# Patient Record
Sex: FEMALE | Race: WHITE | NOT HISPANIC OR LATINO | Employment: FULL TIME | URBAN - METROPOLITAN AREA
[De-identification: names, ages, dates, MRNs, and addresses within clinical notes are randomized per-mention and may not be internally consistent; named-entity substitution may affect disease eponyms.]

---

## 2023-03-29 PROBLEM — E66.813 OBESITY, CLASS III, BMI 40-49.9 (MORBID OBESITY): Status: ACTIVE | Noted: 2023-03-29

## 2023-03-29 PROBLEM — E66.01 OBESITY, CLASS III, BMI 40-49.9 (MORBID OBESITY) (HCC): Status: ACTIVE | Noted: 2023-03-29

## 2023-04-17 PROBLEM — E66.9 OBESITY (BMI 30-39.9): Status: RESOLVED | Noted: 2020-09-23 | Resolved: 2023-04-17

## 2023-05-08 ENCOUNTER — TELEPHONE (OUTPATIENT)
Dept: BARIATRICS | Facility: CLINIC | Age: 33
End: 2023-05-08

## 2023-05-08 DIAGNOSIS — E66.01 OBESITY, CLASS III, BMI 40-49.9 (MORBID OBESITY) (HCC): Primary | ICD-10-CM

## 2023-05-08 NOTE — TELEPHONE ENCOUNTER
Patient is calling asking for the next dose of Wegovy  She states that she took the previous without incident

## 2023-05-21 ENCOUNTER — OFFICE VISIT (OUTPATIENT)
Dept: URGENT CARE | Age: 33
End: 2023-05-21

## 2023-05-21 VITALS
OXYGEN SATURATION: 99 % | HEIGHT: 66 IN | HEART RATE: 90 BPM | BODY MASS INDEX: 38.57 KG/M2 | TEMPERATURE: 97.7 F | RESPIRATION RATE: 18 BRPM | WEIGHT: 240 LBS

## 2023-05-21 DIAGNOSIS — J02.9 SORE THROAT: Primary | ICD-10-CM

## 2023-05-21 DIAGNOSIS — J02.9 SORE THROAT: ICD-10-CM

## 2023-05-21 DIAGNOSIS — H69.83 EUSTACHIAN TUBE DYSFUNCTION, BILATERAL: ICD-10-CM

## 2023-05-21 LAB — S PYO AG THROAT QL: NEGATIVE

## 2023-05-21 RX ORDER — LIDOCAINE HYDROCHLORIDE 20 MG/ML
10 SOLUTION OROPHARYNGEAL 4 TIMES DAILY PRN
Qty: 200 ML | Refills: 0 | Status: SHIPPED | OUTPATIENT
Start: 2023-05-21 | End: 2023-05-21

## 2023-05-21 RX ORDER — LIDOCAINE HYDROCHLORIDE 20 MG/ML
10 SOLUTION OROPHARYNGEAL 4 TIMES DAILY PRN
Qty: 200 ML | Refills: 0 | Status: SHIPPED | OUTPATIENT
Start: 2023-05-21

## 2023-05-21 RX ORDER — AZELASTINE 1 MG/ML
1 SPRAY, METERED NASAL 2 TIMES DAILY
Qty: 1 ML | Refills: 0 | Status: SHIPPED | OUTPATIENT
Start: 2023-05-21

## 2023-05-21 NOTE — PROGRESS NOTES
[address] [facility]        NAME: Destinee Werner is a 29 y o  female  : 1990    MRN: [id number]  DATE: May 21, 2023  TIME: 5:35 PM    Assessment and Plan   Sore throat [J02 9]  1  Sore throat  POCT rapid strepA    Throat culture    Lidocaine Viscous HCl (XYLOCAINE) 2 % mucosal solution      2  Eustachian tube dysfunction, bilateral  azelastine (ASTELIN) 0 1 % nasal spray            Patient Instructions   Lidocaine gargles up to once every 4-6 hours for sore throat  Astelin nasal spray as directed  Take an over the counter decongestant such as guaifenesin  Other methods to soothe sore throat: Tea with honey, salt water gargles 4 times day (1/2 teaspoon of salt in 8ox water), and/or Chloraseptic throat spray    Follow up with PCP in 5-7days  Proceed to ER if symptoms worsen significantly  Chief Complaint     Chief Complaint   Patient presents with   • Sore Throat     Sore throat x 2 days, ear congestion x 2 days         History of Present Illness       This is a 29year old female with 2 day history of sore throat and bilateral ear pressure  Also admits to body aches, post nasal drip, and forehead pressure  Has brief dizziness if she stands up to quickly  Has tried tylenol, no other medications  Denies n/v/d/c, headache, cough, or runny nose  Sore Throat   This is a new problem  The current episode started in the past 7 days  Associated symptoms include ear pain and headaches  Pertinent negatives include no abdominal pain, congestion, coughing, ear discharge, shortness of breath or vomiting  Review of Systems   Review of Systems   Constitutional: Negative for chills and fever  HENT: Positive for ear pain, postnasal drip, sinus pressure and sore throat  Negative for congestion, ear discharge and rhinorrhea  Eyes: Negative for pain and visual disturbance  Respiratory: Negative for cough and shortness of breath  Cardiovascular: Negative for chest pain and palpitations     Gastrointestinal: Negative for abdominal pain and vomiting  Genitourinary: Negative for dysuria and hematuria  Musculoskeletal: Negative for arthralgias and back pain  Skin: Negative for color change and rash  Neurological: Positive for headaches  Negative for seizures and syncope  All other systems reviewed and are negative  Current Medications       Current Outpatient Medications:   •  azelastine (ASTELIN) 0 1 % nasal spray, 1 spray into each nostril 2 (two) times a day Use in each nostril as directed, Disp: 1 mL, Rfl: 0  •  Lidocaine Viscous HCl (XYLOCAINE) 2 % mucosal solution, Swish and spit 10 mL 4 (four) times a day as needed for mouth pain or discomfort, Disp: 200 mL, Rfl: 0  •  omeprazole (PriLOSEC) 20 mg delayed release capsule, TAKE 1 CAPSULE BY MOUTH EVERY DAY, Disp: 90 capsule, Rfl: 1  •  Semaglutide-Weight Management (WEGOVY) 0 5 MG/0 5ML, Inject 0 5 mL (0 5 mg total) under the skin once a week for 28 days, Disp: 2 mL, Rfl: 0  •  acetaminophen (TYLENOL) 500 mg tablet, Take 500 mg by mouth every 6 (six) hours as needed, Disp: , Rfl:   •  budesonide (Pulmicort) 1 MG/2ML nebulizer solution, Take 2 mL (1 mg total) by nebulization daily Rinse mouth after use   (Patient not taking: Reported on 2023), Disp: 60 mL, Rfl: 2    Current Allergies     Allergies as of 2023 - Reviewed 2023   Allergen Reaction Noted   • Ibuprofen Other (See Comments) 2020   • Penicillins Fever 2019            The following portions of the patient's history were reviewed and updated as appropriate: allergies, current medications, past family history, past medical history, past social history, past surgical history and problem list      Past Medical History:   Diagnosis Date   • Allergic    • Anxiety    • Depression    • GERD (gastroesophageal reflux disease)    • Obesity        Past Surgical History:   Procedure Laterality Date   • BARIATRIC SURGERY N/A 2018   •  SECTION         Family History "  Problem Relation Age of Onset   • No Known Problems Mother    • No Known Problems Father    • Depression Sister    • No Known Problems Brother    • No Known Problems Son    • No Known Problems Daughter    • Alzheimer's disease Maternal Grandmother    • Cancer Maternal Grandfather    • No Known Problems Paternal Grandmother    • Stroke Paternal Grandfather    • No Known Problems Brother          Medications have been verified  Objective   Pulse 90   Temp 97 7 °F (36 5 °C)   Resp 18   Ht 5' 6\" (1 676 m)   Wt 109 kg (240 lb) Comment: stated  SpO2 99%   BMI 38 74 kg/m²   No LMP recorded  Physical Exam     Physical Exam  Constitutional:       General: She is not in acute distress  Appearance: Normal appearance  She is normal weight  She is not ill-appearing  HENT:      Head: Normocephalic and atraumatic  Right Ear: Ear canal and external ear normal       Left Ear: Ear canal and external ear normal       Ears:      Comments: Bilateral TMs cloudy  No erythema  Nose: Nose normal       Mouth/Throat:      Mouth: Mucous membranes are moist       Pharynx: Oropharynx is clear  Posterior oropharyngeal erythema present  No oropharyngeal exudate  Comments: Mild pharyngeal erythema  No exudates  Eyes:      Extraocular Movements: Extraocular movements intact  Conjunctiva/sclera: Conjunctivae normal       Pupils: Pupils are equal, round, and reactive to light  Cardiovascular:      Rate and Rhythm: Normal rate and regular rhythm  Heart sounds: Normal heart sounds  No murmur heard  Pulmonary:      Effort: Pulmonary effort is normal  No respiratory distress  Breath sounds: Normal breath sounds  No stridor  No wheezing, rhonchi or rales  Abdominal:      General: Abdomen is flat  Bowel sounds are normal       Palpations: Abdomen is soft  Tenderness: There is no abdominal tenderness  Musculoskeletal:         General: Normal range of motion        Cervical back: Normal range " of motion and neck supple  No rigidity or tenderness  Right lower leg: No edema  Left lower leg: No edema  Lymphadenopathy:      Cervical: No cervical adenopathy  Skin:     General: Skin is warm and dry  Capillary Refill: Capillary refill takes less than 2 seconds  Neurological:      General: No focal deficit present  Mental Status: She is alert and oriented to person, place, and time  Mental status is at baseline  Psychiatric:         Mood and Affect: Mood normal          Behavior: Behavior normal          Thought Content:  Thought content normal          Judgment: Judgment normal        Mary Barcenas PA-C

## 2023-05-21 NOTE — PATIENT INSTRUCTIONS
Lidocaine gargles up to once every 4-6 hours for sore throat  Astelin nasal spray as directed  Take an over the counter decongestant such as guaifenesin  Other methods to soothe sore throat: Tea with honey, salt water gargles 4 times day (1/2 teaspoon of salt in 8ox water), and/or Chloraseptic throat spray    Follow up with PCP in 5-7days  Proceed to ER if symptoms worsen significantly

## 2023-05-24 LAB — BACTERIA THROAT CULT: NORMAL

## 2023-06-09 ENCOUNTER — TELEPHONE (OUTPATIENT)
Dept: BARIATRICS | Facility: CLINIC | Age: 33
End: 2023-06-09

## 2023-06-09 DIAGNOSIS — E66.01 OBESITY, CLASS III, BMI 40-49.9 (MORBID OBESITY) (HCC): Primary | ICD-10-CM

## 2023-06-09 DIAGNOSIS — E66.01 OBESITY, CLASS III, BMI 40-49.9 (MORBID OBESITY) (HCC): ICD-10-CM

## 2023-06-09 NOTE — TELEPHONE ENCOUNTER
Patient is calling asking for the next dose of Wegovy  She states that she has tolerated the previous dose without incident

## 2023-06-12 DIAGNOSIS — E66.01 OBESITY, CLASS III, BMI 40-49.9 (MORBID OBESITY) (HCC): ICD-10-CM

## 2023-06-12 RX ORDER — SEMAGLUTIDE 1 MG/.5ML
INJECTION, SOLUTION SUBCUTANEOUS
Refills: 0 | OUTPATIENT
Start: 2023-06-12

## 2023-06-12 NOTE — TELEPHONE ENCOUNTER
Please notify the patient that I have sent this to the [facility] in [place] as they have this medication in stock

## 2023-06-19 ENCOUNTER — TELEPHONE (OUTPATIENT)
Dept: BARIATRICS | Facility: CLINIC | Age: 33
End: 2023-06-19

## 2023-06-19 NOTE — TELEPHONE ENCOUNTER
Pt is currently out of the area and unsure if it's temporary or permanent so she will call us back when she knows her schedule to r/s 6/19/23 mw appt

## 2023-07-14 ENCOUNTER — TELEPHONE (OUTPATIENT)
Dept: BARIATRICS | Facility: CLINIC | Age: 33
End: 2023-07-14

## 2023-07-14 DIAGNOSIS — E66.01 OBESITY, CLASS III, BMI 40-49.9 (MORBID OBESITY) (HCC): Primary | ICD-10-CM

## 2023-07-14 NOTE — TELEPHONE ENCOUNTER
Can you please send a script for the 0.5mg of Wegovy to [address] [place] [address] [facility]. I gave them a verbal but they need a script also.

## 2023-08-22 DIAGNOSIS — E66.01 OBESITY, CLASS III, BMI 40-49.9 (MORBID OBESITY) (HCC): Primary | ICD-10-CM

## 2023-09-11 DIAGNOSIS — E66.01 OBESITY, CLASS III, BMI 40-49.9 (MORBID OBESITY) (HCC): ICD-10-CM

## 2023-09-12 DIAGNOSIS — E66.01 OBESITY, CLASS III, BMI 40-49.9 (MORBID OBESITY) (HCC): Primary | ICD-10-CM

## 2023-09-12 RX ORDER — SEMAGLUTIDE 0.5 MG/.5ML
INJECTION, SOLUTION SUBCUTANEOUS
Qty: 2 ML | Refills: 0 | OUTPATIENT
Start: 2023-09-12

## 2023-09-12 NOTE — TELEPHONE ENCOUNTER
Patient was informed. She states that she is now taking the 0.5mg of Wegovy and she has tolerated it without incident. She is asking that you send the 1mg of Wegovy to the [facility].

## 2023-09-22 ENCOUNTER — TELEPHONE (OUTPATIENT)
Dept: BARIATRICS | Facility: CLINIC | Age: 33
End: 2023-09-22

## 2023-09-22 NOTE — TELEPHONE ENCOUNTER
LMOM to Inform pt her [facility] is approved but the insurance wont cover the wegovy at [facility], she needs to find a pharmacy that approves the pharmacy

## 2024-02-21 PROBLEM — Z00.00 ROUTINE HEALTH MAINTENANCE: Status: RESOLVED | Noted: 2020-09-24 | Resolved: 2024-02-21

## 2024-06-28 ENCOUNTER — TELEPHONE (OUTPATIENT)
Age: 34
End: 2024-06-28

## 2024-06-28 NOTE — TELEPHONE ENCOUNTER
Patient is a past patient of Dr Alan who has not been seen in the office since 4/2023, she moved to [place] but is moving back to the area in the fall.  She is requesting a new Wegovy .25 rx be sent to the [facility] at [address] in [place], [place].  She is aware that Dr ZIMMERMAN may want to see her before he writes the rx and she requested a virtual visit.  Can someone from the office please give her a call at [phone]?  Thanks.

## 2024-07-01 NOTE — TELEPHONE ENCOUNTER
Spoke to pt made her aware appt needs to be in person right now she is unable to make it into office due to being too far from [facility], she is going to reach out to her pcp to see if they can help her with medication until she can get into our office in December.

## 2024-07-25 DIAGNOSIS — Z00.6 ENCOUNTER FOR EXAMINATION FOR NORMAL COMPARISON OR CONTROL IN CLINICAL RESEARCH PROGRAM: ICD-10-CM

## 2024-08-09 ENCOUNTER — OFFICE VISIT (OUTPATIENT)
Dept: FAMILY MEDICINE CLINIC | Facility: CLINIC | Age: 34
End: 2024-08-09
Payer: COMMERCIAL

## 2024-08-09 VITALS
WEIGHT: 255.8 LBS | TEMPERATURE: 98.7 F | HEIGHT: 66 IN | SYSTOLIC BLOOD PRESSURE: 140 MMHG | BODY MASS INDEX: 41.11 KG/M2 | DIASTOLIC BLOOD PRESSURE: 60 MMHG | HEART RATE: 91 BPM | OXYGEN SATURATION: 96 %

## 2024-08-09 DIAGNOSIS — E66.01 OBESITY, CLASS III, BMI 40-49.9 (MORBID OBESITY) (HCC): Primary | ICD-10-CM

## 2024-08-09 DIAGNOSIS — R73.01 IFG (IMPAIRED FASTING GLUCOSE): ICD-10-CM

## 2024-08-09 DIAGNOSIS — Z00.00 ROUTINE HEALTH MAINTENANCE: ICD-10-CM

## 2024-08-09 DIAGNOSIS — D50.9 IRON DEFICIENCY ANEMIA, UNSPECIFIED IRON DEFICIENCY ANEMIA TYPE: ICD-10-CM

## 2024-08-09 DIAGNOSIS — E55.9 VITAMIN D DEFICIENCY: ICD-10-CM

## 2024-08-09 PROCEDURE — 99214 OFFICE O/P EST MOD 30 MIN: CPT | Performed by: FAMILY MEDICINE

## 2024-08-09 PROCEDURE — 99395 PREV VISIT EST AGE 18-39: CPT | Performed by: FAMILY MEDICINE

## 2024-08-09 RX ORDER — SEMAGLUTIDE 0.25 MG/.5ML
INJECTION, SOLUTION SUBCUTANEOUS
Qty: 2 ML | Refills: 1 | Status: SHIPPED | OUTPATIENT
Start: 2024-08-09 | End: 2024-08-22

## 2024-08-12 NOTE — PROGRESS NOTES
Assessment/Plan:    33 y/o woman with:  morbid obesity, iron-deficiency anemia, IFG and vit. D def. Along with annual well visit. Will begin trial of Wegovy will check labs. Discussed supportive care and return parameters.     Regarding Annual well visit. Discussed various safety and health maintenance issues including healthy diet like the Mediterranean diet, exercise, ample sleep, stress reduction, and healthy weight as tolerated. Discussed supportive care and return parameters.     No problem-specific Assessment & Plan notes found for this encounter.       Diagnoses and all orders for this visit:    Obesity, Class III, BMI 40-49.9 (morbid obesity) ([facility])  -     Semaglutide-Weight Management (Wegovy) 0.25 MG/0.5ML; Inject 0.25 mg under the skin weekly  -     CBC and differential; Future  -     Comprehensive metabolic panel; Future  -     TSH, 3rd generation with Free T4 reflex; Future  -     Lipid Panel with Direct LDL reflex; Future  -     Hemoglobin A1C; Future  -     Albumin / creatinine urine ratio; Future  -     Vitamin D 25 hydroxy; Future  -     Iron Panel (Includes Ferritin, Iron Sat%, Iron, and TIBC); Future    Routine health maintenance  -     CBC and differential; Future  -     Comprehensive metabolic panel; Future  -     TSH, 3rd generation with Free T4 reflex; Future  -     Lipid Panel with Direct LDL reflex; Future  -     Hemoglobin A1C; Future  -     Albumin / creatinine urine ratio; Future  -     Vitamin D 25 hydroxy; Future  -     Iron Panel (Includes Ferritin, Iron Sat%, Iron, and TIBC); Future    Iron deficiency anemia, unspecified iron deficiency anemia type  -     CBC and differential; Future  -     Comprehensive metabolic panel; Future  -     TSH, 3rd generation with Free T4 reflex; Future  -     Lipid Panel with Direct LDL reflex; Future  -     Hemoglobin A1C; Future  -     Albumin / creatinine urine ratio; Future  -     Vitamin D 25 hydroxy; Future  -     Iron Panel (Includes Ferritin, Iron  Sat%, Iron, and TIBC); Future    IFG (impaired fasting glucose)  -     CBC and differential; Future  -     Comprehensive metabolic panel; Future  -     TSH, 3rd generation with Free T4 reflex; Future  -     Lipid Panel with Direct LDL reflex; Future  -     Hemoglobin A1C; Future  -     Albumin / creatinine urine ratio; Future  -     Vitamin D 25 hydroxy; Future  -     Iron Panel (Includes Ferritin, Iron Sat%, Iron, and TIBC); Future    Vitamin D deficiency  -     CBC and differential; Future  -     Comprehensive metabolic panel; Future  -     TSH, 3rd generation with Free T4 reflex; Future  -     Lipid Panel with Direct LDL reflex; Future  -     Hemoglobin A1C; Future  -     Albumin / creatinine urine ratio; Future  -     Vitamin D 25 hydroxy; Future  -     Iron Panel (Includes Ferritin, Iron Sat%, Iron, and TIBC); Future          Subjective:     Chief Complaint   Patient presents with    Weight Loss     Pt here to discuss weight loss.  Pt has had surgery, normal dieting, etc.   Pt has started on Wegovy but has not continued due no meds available.  No other complaints   [facility]        Patient ID: Deanna Boston is a 34 y.o. female.    Patient is a 33 y/o woman who presents for follow-up on morbid obesity, iron-deficiency anemia, IFG and vit. D def. Pt admits being stable on meds. And denies acute complaints no fevers chills nausea or vomiting. Pt also here for annual well visit admits being active eats and sleeps well.        The following portions of the patient's history were reviewed and updated as appropriate: allergies, current medications, past family history, past medical history, past social history, past surgical history and problem list.    Review of Systems   Constitutional: Negative.    HENT: Negative.     Eyes: Negative.    Respiratory: Negative.     Cardiovascular: Negative.    Gastrointestinal: Negative.    Endocrine: Negative.    Genitourinary: Negative.    Musculoskeletal: Negative.    Allergic/Immunologic:  "Negative.    Neurological: Negative.    Hematological: Negative.    Psychiatric/Behavioral: Negative.     All other systems reviewed and are negative.        Objective:      /60 (BP Location: Left arm, Patient Position: Standing, Cuff Size: Large)   Pulse 91   Temp 98.7 °F (37.1 °C)   Ht 5' 5.5\" (1.664 m)   Wt 116 kg (255 lb 12.8 oz)   SpO2 96%   BMI 41.92 kg/m²          Physical Exam  Constitutional:       Appearance: She is well-developed.   HENT:      Head: Atraumatic.      Right Ear: External ear normal.      Left Ear: External ear normal.   Eyes:      Extraocular Movements: EOM normal.      Conjunctiva/sclera: Conjunctivae normal.      Pupils: Pupils are equal, round, and reactive to light.   Cardiovascular:      Rate and Rhythm: Normal rate and regular rhythm.      Heart sounds: Normal heart sounds.   Pulmonary:      Effort: Pulmonary effort is normal. No respiratory distress.      Breath sounds: Normal breath sounds.   Abdominal:      General: Bowel sounds are normal. There is no distension.      Palpations: Abdomen is soft.      Tenderness: There is no abdominal tenderness. There is no guarding or rebound.   Musculoskeletal:         General: Normal range of motion.      Cervical back: Normal range of motion.   Skin:     General: Skin is warm and dry.   Neurological:      Mental Status: She is alert and oriented to person, place, and time.      Cranial Nerves: No cranial nerve deficit.      Sensory: No sensory deficit.   Psychiatric:         Mood and Affect: Mood and affect normal.         Behavior: Behavior normal.         Thought Content: Thought content normal.         Judgment: Judgment normal.             Depression Screening and Follow-up Plan: Patient's depression screening was positive with a PHQ-2 score of 3. Their PHQ-9 score was 13. Patient assessed for underlying major depression. Brief counseling provided and recommend additional follow-up/re-evaluation next office visit.       "

## 2024-08-16 ENCOUNTER — TELEPHONE (OUTPATIENT)
Age: 34
End: 2024-08-16

## 2024-08-16 NOTE — TELEPHONE ENCOUNTER
PA for Semaglutide-Weight Management (Wegovy) 0.25 MG/0.5ML SUBMITTED     via    []CMM-KEY:   [x]Woody-Case ID # [id number]   []Faxed to plan   []Other website   []Phone call Case ID #     Office notes sent, clinical questions answered. Awaiting determination    Turnaround time for your insurance to make a decision on your Prior Authorization can take 7-21 business days.

## 2024-08-19 NOTE — TELEPHONE ENCOUNTER
PA for wegovy 0.25 mg Approved     Date(s) approved August 18, 2024 to March 18, 2025     Case # [id number]     Patient advised by          [] MyChart Message  [x] Phone call   []LMOM  []L/M to call office as no active Communication consent on file  []Unable to leave detailed message as VM not approved on Communication consent       Pharmacy advised by    [x]Fax  []Phone call

## 2024-08-21 DIAGNOSIS — E66.01 OBESITY, CLASS III, BMI 40-49.9 (MORBID OBESITY) (HCC): ICD-10-CM

## 2024-08-22 RX ORDER — SEMAGLUTIDE 0.25 MG/.5ML
INJECTION, SOLUTION SUBCUTANEOUS
Start: 2024-08-22

## 2024-09-08 PROBLEM — Z00.00 ROUTINE HEALTH MAINTENANCE: Status: RESOLVED | Noted: 2020-09-24 | Resolved: 2024-09-08

## 2024-09-16 ENCOUNTER — TELEPHONE (OUTPATIENT)
Dept: FAMILY MEDICINE CLINIC | Facility: CLINIC | Age: 34
End: 2024-09-16

## 2024-09-16 ENCOUNTER — TELEPHONE (OUTPATIENT)
Age: 34
End: 2024-09-16

## 2024-09-16 ENCOUNTER — DOCUMENTATION (OUTPATIENT)
Dept: FAMILY MEDICINE CLINIC | Facility: CLINIC | Age: 34
End: 2024-09-16

## 2024-09-16 DIAGNOSIS — E66.01 OBESITY, CLASS III, BMI 40-49.9 (MORBID OBESITY) (HCC): ICD-10-CM

## 2024-09-16 RX ORDER — SEMAGLUTIDE 0.25 MG/.5ML
INJECTION, SOLUTION SUBCUTANEOUS
Qty: 2 ML | Refills: 0 | Status: SHIPPED | OUTPATIENT
Start: 2024-09-16

## 2024-09-16 NOTE — TELEPHONE ENCOUNTER
This is a patient of Dr Doran.  He prescribed Wegovy for her at her last visit.  She hasn't started it yet because of the back order.  [facility] is [facility] [address] [place] [id number] that has a box but they will only hold it til 5pm for her.  Can you please send this to that pharmacy for her. Thank you.

## 2024-09-16 NOTE — TELEPHONE ENCOUNTER
"Pt called, requesting an update on the [facility] being sent [facility] asap - they only have one box remaining.    8/9 Pt was prescribed Wegovy  8/19 prior authorization was received  8/22 - Telephone encounter states patient was going to call back with location of where medication was in stock (pt states this is not correct).   9/16 - patient called requesting update - asked why the [facility] was [facility] - explained according to prior notes we were waiting for a return call from the pt with where it is in stock - pt states this is NOT correct - this was for her .     S/w Ruby Darden clinical who advised they would do their best to send to the pharmacy today.  Dr Doran is not in the office today. Pt would like done \"now\", is not willing to wait - wants to know WHY another provider is unable to send medication to the pharmacy? asked to s/w an .     Warm TSF to Ruby Felipe - advised Kelsi would discuss further with patient - warm TSF patient to Kelsi to assist further.       "

## 2024-09-16 NOTE — TELEPHONE ENCOUNTER
Patient called, is kindly requesting the following medication be sent to the pharmacy - prior authorization was received 8/19 - pharmacy never received the medication order.  *urgent - pharmacy only has one box remaining*  Please contact patient once script is sent to the pharmacy.    Medication: Semaglutide-Weight Management (Wegovy) 0.25 MG/0.5ML     Dose/Frequency:      INJECT 0.25 MG UNDER THE SKIN WEEKLY                    Quantity: ?    Pharmacy: [facility]     Office:   [x] PCP/Provider - Gasper Doran MD   [] Speciality/Provider -     Does the patient have enough for 3 days?   [] Yes   [x] No - Send as HP to POD      Media file 8/19/2024  PA for wegovy 0.25 mg Approved

## 2024-09-16 NOTE — TELEPHONE ENCOUNTER
I did speak to Dr Doran.  He said we can try and call the Wegovy into the pharmacy for the patient with no refills.  The patient and I both tried to get through to the pharmacy but we couldn't get through.  Patient is going to try to call [facility] in [place] back later and then 3 way into our office.  The Pharm # is [phone] if needed.

## 2024-09-16 NOTE — TELEPHONE ENCOUNTER
Patient called stating she was told by Kelsi to call and ask to speak to a nurse in regards to getting Wegovy script over to pharmacy in [place] , warm transferred to office for further assistance .

## 2024-09-16 NOTE — TELEPHONE ENCOUNTER
Order sent to provider to approve. I updated in the Pharmacy in patient chart where prescription needs to go to.

## 2024-09-16 NOTE — TELEPHONE ENCOUNTER
Called patient and advised her  electronically  sent over script to [facility]. Pt was appreciative and said thank you.

## 2024-09-16 NOTE — TELEPHONE ENCOUNTER
Harjinder Dolan, it is my understanding that we cannot send prescriptions outside of [place] due to licensing issues.

## 2024-09-16 NOTE — TELEPHONE ENCOUNTER
Spoke to patient directly, I explained we have verbally called in the Wegovy by Kelsi Martinez who spoke to  andwe also put written script for Wegovy to be sent to [facility] and Andreea would have to approve.     [facility] she can get in contact with them directly to see if they got the order.     Pt will call back and ask for me.

## 2024-09-17 NOTE — TELEPHONE ENCOUNTER
Called patient to verify she script was at her pharmacy she requested. Pt stated the script was received however she would not take it because the cost for the medication was $800 and the patient could not afford it and she was told to check with her insurance company.

## 2024-11-07 ENCOUNTER — TELEPHONE (OUTPATIENT)
Age: 34
End: 2024-11-07

## 2024-11-12 NOTE — PROGRESS NOTES
"Ambulatory Visit  Name: Deanna Boston      : 1990      MRN: [id number]  Encounter Provider: Claudette Laws PA-C  Encounter Date: 11/15/2024   Encounter department: [facility]    Assessment & Plan  Vaginal itching    Orders:    nystatin-triamcinolone (MYCOLOG-II) cream; Apply topically 2 (two) times a day    Sure swab and genital culture collected to identify vaginal infection.  Treatment: Mycolog cream sent to pharmacy for vaginal itching.   Recommend taking probiotic with acidophilus or eating yogurt daily. Avoid irritating soaps, lubricants, or lotions. Avoid Vagisil.  No tight fitted pants or underwear, avoid bubble baths, do not stay in wet swimsuit/moist clothing. Wear cotton underwear. Use sensitive laundry detergent.   Use white dove bar soap on vulva.   Recommend organic coconut oil for vulvar irritation/itching.  Discussed obtaining records from previous health network with treatment plan and culture results for review.   Discussed unknown if has herpes without visualization of herpetic lesion. We discussed ruling out other causes of vaginal itching and if symptoms persist after treatment, lifestyle changes and cultures will discuss vulvar biopsy.   Encouraged to complete lab work including hemoglobin A1C from PCP.   Will review tests once resulted in Epic and send treatment to pharmacy, if applicable.   Return to office for annual well exam or sooner if needed.      History of Present Illness       Deanna Boston is a 34 y.o. female who presents for vaginal irritation/itching for the past 8-9 months. Was told by provider in [place] that a genital culture \"showed a virus\" and diagnosed with herpes. Patient reports never having any lesions or known exposure to HSV. She was prescribed acyclovir initially and symptoms persisted so prescribed Valtrex. She reports no relief in symptoms after Acyclovir and Valtrex treatments. She is having daily itching to labia minora and at introitus. "   Has paragard IUD removed in 2024 and currently using condoms for contraception protection. She reports periods are regular lasting three day with light flow. Menses are acceptable.   (-) abnormal discharge:    (-) abnormal odor:   (+) vulvar irritation  (+) vaginal itching   (-) dysuria and urinary symptoms  Patient is sexually active with  of 10 years in a monogamous relationship. Condom use; yes  Last STI screenin2023 and negative GC/CT.   History of STI/STD: denies  Is currently  for over 10 years   denies changes in lifestyle, medication, soaps, detergents. Uses [employer]elle.   At-home treatments utilized: using Vagisil.        History obtained from : patient  Review of Systems  Medical History Reviewed by provider this encounter:       Past Medical History   Past Medical History:   Diagnosis Date    Allergic     Anxiety     Depression     GERD (gastroesophageal reflux disease)     Obesity      Past Surgical History:   Procedure Laterality Date    BARIATRIC SURGERY N/A 2018     SECTION       Family History   Problem Relation Age of Onset    No Known Problems Mother     No Known Problems Father     Depression Sister     No Known Problems Brother     No Known Problems Son     No Known Problems Daughter     Alzheimer's disease Maternal Grandmother     Cancer Maternal Grandfather     No Known Problems Paternal Grandmother     Stroke Paternal Grandfather     No Known Problems Brother      Current Outpatient Medications on File Prior to Visit   Medication Sig Dispense Refill    acetaminophen (TYLENOL) 500 mg tablet Take 500 mg by mouth every 6 (six) hours as needed      azelastine (ASTELIN) 0.1 % nasal spray 1 spray into each nostril 2 (two) times a day Use in each nostril as directed (Patient not taking: Reported on 2024) 1 mL 0    budesonide (PULMICORT) 1 MG/2ML nebulizer solution TAKE 2 ML (1 MG TOTAL) BY NEBULIZATION DAILY RINSE MOUTH AFTER USE. (Patient not taking:  Reported on 8/9/2024) 180 mL 1    Lidocaine Viscous HCl (XYLOCAINE) 2 % mucosal solution SWISH AND SPIT 10 ML 4 (FOUR) TIMES A DAY AS NEEDED FOR MOUTH PAIN OR DISCOMFORT 200 mL 0    omeprazole (PriLOSEC) 20 mg delayed release capsule TAKE 1 CAPSULE BY MOUTH EVERY DAY 90 capsule 1    Semaglutide-Weight Management (Wegovy) 0.25 MG/0.5ML Inject 0.25 mg under the skin weekly 2 mL 0     No current facility-administered medications on file prior to visit.     Allergies   Allergen Reactions    Ibuprofen Other (See Comments)     Bariatric surgery    Penicillins Fever      Current Outpatient Medications on File Prior to Visit   Medication Sig Dispense Refill    acetaminophen (TYLENOL) 500 mg tablet Take 500 mg by mouth every 6 (six) hours as needed      azelastine (ASTELIN) 0.1 % nasal spray 1 spray into each nostril 2 (two) times a day Use in each nostril as directed (Patient not taking: Reported on 8/9/2024) 1 mL 0    budesonide (PULMICORT) 1 MG/2ML nebulizer solution TAKE 2 ML (1 MG TOTAL) BY NEBULIZATION DAILY RINSE MOUTH AFTER USE. (Patient not taking: Reported on 8/9/2024) 180 mL 1    Lidocaine Viscous HCl (XYLOCAINE) 2 % mucosal solution SWISH AND SPIT 10 ML 4 (FOUR) TIMES A DAY AS NEEDED FOR MOUTH PAIN OR DISCOMFORT 200 mL 0    omeprazole (PriLOSEC) 20 mg delayed release capsule TAKE 1 CAPSULE BY MOUTH EVERY DAY 90 capsule 1    Semaglutide-Weight Management (Wegovy) 0.25 MG/0.5ML Inject 0.25 mg under the skin weekly 2 mL 0     No current facility-administered medications on file prior to visit.      Social History     Tobacco Use    Smoking status: Never    Smokeless tobacco: Never   Vaping Use    Vaping status: Never Used   Substance and Sexual Activity    Alcohol use: Yes    Drug use: Never    Sexual activity: Yes     Partners: Male     Birth control/protection: I.U.D.         Objective     There were no vitals taken for this visit.    Physical Exam  Constitutional:       General: She is not in acute  distress.  HENT:      Head: Normocephalic.   Pulmonary:      Effort: Pulmonary effort is normal.   Abdominal:      Palpations: Abdomen is soft.   Genitourinary:     General: Normal vulva.      Exam position: Lithotomy position.      Labia:         Right: Tenderness present. No rash or lesion.         Left: No rash, tenderness or lesion.       Vagina: Vaginal discharge present. No erythema, bleeding or lesions.      Cervix: Discharge present. No cervical motion tenderness, friability, lesion, erythema or cervical bleeding.      Uterus: Not tender.       Adnexa:         Right: No tenderness.          Left: No tenderness.            Comments: Tenderness upon palpation  Skin:     General: Skin is warm and dry.   Neurological:      Mental Status: She is alert and oriented to person, place, and time.   Psychiatric:         Mood and Affect: Mood normal.         Behavior: Behavior normal.         Thought Content: Thought content normal.         Judgment: Judgment normal.

## 2024-11-15 ENCOUNTER — OFFICE VISIT (OUTPATIENT)
Dept: OBGYN CLINIC | Facility: CLINIC | Age: 34
End: 2024-11-15
Payer: COMMERCIAL

## 2024-11-15 VITALS
HEIGHT: 65 IN | WEIGHT: 268 LBS | SYSTOLIC BLOOD PRESSURE: 140 MMHG | BODY MASS INDEX: 44.65 KG/M2 | DIASTOLIC BLOOD PRESSURE: 74 MMHG

## 2024-11-15 DIAGNOSIS — Z11.3 ROUTINE SCREENING FOR STI (SEXUALLY TRANSMITTED INFECTION): ICD-10-CM

## 2024-11-15 DIAGNOSIS — N89.8 VAGINAL ITCHING: Primary | ICD-10-CM

## 2024-11-15 PROCEDURE — 99203 OFFICE O/P NEW LOW 30 MIN: CPT

## 2024-11-15 RX ORDER — NYSTATIN AND TRIAMCINOLONE ACETONIDE 100000; 1 [USP'U]/G; MG/G
CREAM TOPICAL 2 TIMES DAILY
Qty: 30 G | Refills: 1 | Status: SHIPPED | OUTPATIENT
Start: 2024-11-15

## 2024-11-16 LAB
BV BACTERIA RRNA VAG QL NAA+PROBE: NEGATIVE
C GLABRATA RNA VAG QL NAA+PROBE: NOT DETECTED
C TRACH RRNA SPEC QL NAA+PROBE: NOT DETECTED
CANDIDA RRNA VAG QL PROBE: NOT DETECTED
N GONORRHOEA RRNA SPEC QL NAA+PROBE: NOT DETECTED
T VAGINALIS RRNA SPEC QL NAA+PROBE: NOT DETECTED

## 2024-11-18 ENCOUNTER — RESULTS FOLLOW-UP (OUTPATIENT)
Dept: OBGYN CLINIC | Facility: CLINIC | Age: 34
End: 2024-11-18

## 2024-11-23 ENCOUNTER — HOSPITAL ENCOUNTER (EMERGENCY)
Facility: HOSPITAL | Age: 34
Discharge: HOME | End: 2024-11-23
Attending: EMERGENCY MEDICINE
Payer: COMMERCIAL

## 2024-11-23 ENCOUNTER — APPOINTMENT (EMERGENCY)
Dept: RADIOLOGY | Facility: HOSPITAL | Age: 34
End: 2024-11-23
Payer: COMMERCIAL

## 2024-11-23 VITALS
WEIGHT: 265.65 LBS | TEMPERATURE: 98.3 F | SYSTOLIC BLOOD PRESSURE: 120 MMHG | DIASTOLIC BLOOD PRESSURE: 56 MMHG | RESPIRATION RATE: 16 BRPM | BODY MASS INDEX: 44.21 KG/M2 | OXYGEN SATURATION: 98 % | HEART RATE: 58 BPM

## 2024-11-23 DIAGNOSIS — E83.42 HYPOMAGNESEMIA: ICD-10-CM

## 2024-11-23 DIAGNOSIS — E87.6 HYPOKALEMIA: ICD-10-CM

## 2024-11-23 DIAGNOSIS — R00.2 PALPITATIONS: Primary | ICD-10-CM

## 2024-11-23 LAB
ALBUMIN SERPL BCG-MCNC: 3.9 G/DL (ref 3.5–5)
ALP SERPL-CCNC: 55 U/L (ref 34–104)
ALT SERPL W P-5'-P-CCNC: 9 U/L (ref 7–52)
ANION GAP SERPL CALCULATED.3IONS-SCNC: 5 MMOL/L (ref 4–13)
APTT PPP: 26 SECONDS (ref 23–34)
AST SERPL W P-5'-P-CCNC: 10 U/L (ref 13–39)
ATRIAL RATE: 84 BPM
BASOPHILS # BLD AUTO: 0.04 THOUSANDS/ÂΜL (ref 0–0.1)
BASOPHILS NFR BLD AUTO: 0 % (ref 0–1)
BILIRUB SERPL-MCNC: 0.42 MG/DL (ref 0.2–1)
BILIRUB UR QL STRIP: NEGATIVE
BNP SERPL-MCNC: 52 PG/ML (ref 0–100)
BUN SERPL-MCNC: 13 MG/DL (ref 5–25)
CALCIUM SERPL-MCNC: 9 MG/DL (ref 8.4–10.2)
CARDIAC TROPONIN I PNL SERPL HS: <2 NG/L (ref ?–50)
CHLORIDE SERPL-SCNC: 106 MMOL/L (ref 96–108)
CLARITY UR: CLEAR
CO2 SERPL-SCNC: 24 MMOL/L (ref 21–32)
COLOR UR: YELLOW
CREAT SERPL-MCNC: 0.74 MG/DL (ref 0.6–1.3)
D DIMER PPP FEU-MCNC: 0.32 UG/ML FEU
EOSINOPHIL # BLD AUTO: 0.16 THOUSAND/ÂΜL (ref 0–0.61)
EOSINOPHIL NFR BLD AUTO: 2 % (ref 0–6)
ERYTHROCYTE [DISTWIDTH] IN BLOOD BY AUTOMATED COUNT: 13.5 % (ref 11.6–15.1)
EXT PREGNANCY TEST URINE: NEGATIVE
EXT. CONTROL: NORMAL
GFR SERPL CREATININE-BSD FRML MDRD: 107 ML/MIN/1.73SQ M
GLUCOSE SERPL-MCNC: 147 MG/DL (ref 65–140)
GLUCOSE UR STRIP-MCNC: NEGATIVE MG/DL
HCT VFR BLD AUTO: 39.7 % (ref 34.8–46.1)
HGB BLD-MCNC: 12.6 G/DL (ref 11.5–15.4)
HGB UR QL STRIP.AUTO: NEGATIVE
IMM GRANULOCYTES # BLD AUTO: 0.02 THOUSAND/UL (ref 0–0.2)
IMM GRANULOCYTES NFR BLD AUTO: 0 % (ref 0–2)
INR PPP: 1.02 (ref 0.85–1.19)
KETONES UR STRIP-MCNC: ABNORMAL MG/DL
LEUKOCYTE ESTERASE UR QL STRIP: NEGATIVE
LYMPHOCYTES # BLD AUTO: 2.33 THOUSANDS/ÂΜL (ref 0.6–4.47)
LYMPHOCYTES NFR BLD AUTO: 25 % (ref 14–44)
MAGNESIUM SERPL-MCNC: 1.6 MG/DL (ref 1.9–2.7)
MCH RBC QN AUTO: 25.9 PG (ref 26.8–34.3)
MCHC RBC AUTO-ENTMCNC: 31.7 G/DL (ref 31.4–37.4)
MCV RBC AUTO: 82 FL (ref 82–98)
MONOCYTES # BLD AUTO: 0.44 THOUSAND/ÂΜL (ref 0.17–1.22)
MONOCYTES NFR BLD AUTO: 5 % (ref 4–12)
NEUTROPHILS # BLD AUTO: 6.25 THOUSANDS/ÂΜL (ref 1.85–7.62)
NEUTS SEG NFR BLD AUTO: 68 % (ref 43–75)
NITRITE UR QL STRIP: NEGATIVE
NRBC BLD AUTO-RTO: 0 /100 WBCS
P AXIS: 34 DEGREES
PH UR STRIP.AUTO: 5.5 [PH] (ref 4.5–8)
PLATELET # BLD AUTO: 267 THOUSANDS/UL (ref 149–390)
PMV BLD AUTO: 11.4 FL (ref 8.9–12.7)
POTASSIUM SERPL-SCNC: 3.4 MMOL/L (ref 3.5–5.3)
PR INTERVAL: 118 MS
PROT SERPL-MCNC: 6.7 G/DL (ref 6.4–8.4)
PROT UR STRIP-MCNC: NEGATIVE MG/DL
PROTHROMBIN TIME: 13.6 SECONDS (ref 12.3–15)
QRS AXIS: 25 DEGREES
QRSD INTERVAL: 82 MS
QT INTERVAL: 358 MS
QTC INTERVAL: 423 MS
RBC # BLD AUTO: 4.86 MILLION/UL (ref 3.81–5.12)
SODIUM SERPL-SCNC: 135 MMOL/L (ref 135–147)
SP GR UR STRIP.AUTO: >=1.03 (ref 1–1.03)
T WAVE AXIS: -1 DEGREES
TSH SERPL DL<=0.05 MIU/L-ACNC: 0.87 UIU/ML (ref 0.45–4.5)
UROBILINOGEN UR QL STRIP.AUTO: 1 E.U./DL
VENTRICULAR RATE: 84 BPM
WBC # BLD AUTO: 9.24 THOUSAND/UL (ref 4.31–10.16)

## 2024-11-23 PROCEDURE — 71046 X-RAY EXAM CHEST 2 VIEWS: CPT

## 2024-11-23 PROCEDURE — 85025 COMPLETE CBC W/AUTO DIFF WBC: CPT | Performed by: EMERGENCY MEDICINE

## 2024-11-23 PROCEDURE — 81025 URINE PREGNANCY TEST: CPT | Performed by: EMERGENCY MEDICINE

## 2024-11-23 PROCEDURE — 81003 URINALYSIS AUTO W/O SCOPE: CPT

## 2024-11-23 PROCEDURE — 80053 COMPREHEN METABOLIC PANEL: CPT | Performed by: EMERGENCY MEDICINE

## 2024-11-23 PROCEDURE — 93010 ELECTROCARDIOGRAM REPORT: CPT | Performed by: INTERNAL MEDICINE

## 2024-11-23 PROCEDURE — 93005 ELECTROCARDIOGRAM TRACING: CPT

## 2024-11-23 PROCEDURE — 83880 ASSAY OF NATRIURETIC PEPTIDE: CPT | Performed by: EMERGENCY MEDICINE

## 2024-11-23 PROCEDURE — 85379 FIBRIN DEGRADATION QUANT: CPT | Performed by: EMERGENCY MEDICINE

## 2024-11-23 PROCEDURE — 96361 HYDRATE IV INFUSION ADD-ON: CPT

## 2024-11-23 PROCEDURE — 84484 ASSAY OF TROPONIN QUANT: CPT | Performed by: EMERGENCY MEDICINE

## 2024-11-23 PROCEDURE — 99285 EMERGENCY DEPT VISIT HI MDM: CPT

## 2024-11-23 PROCEDURE — 85730 THROMBOPLASTIN TIME PARTIAL: CPT | Performed by: EMERGENCY MEDICINE

## 2024-11-23 PROCEDURE — 84443 ASSAY THYROID STIM HORMONE: CPT | Performed by: EMERGENCY MEDICINE

## 2024-11-23 PROCEDURE — 36415 COLL VENOUS BLD VENIPUNCTURE: CPT | Performed by: EMERGENCY MEDICINE

## 2024-11-23 PROCEDURE — 83735 ASSAY OF MAGNESIUM: CPT | Performed by: EMERGENCY MEDICINE

## 2024-11-23 PROCEDURE — 99285 EMERGENCY DEPT VISIT HI MDM: CPT | Performed by: EMERGENCY MEDICINE

## 2024-11-23 PROCEDURE — 96365 THER/PROPH/DIAG IV INF INIT: CPT

## 2024-11-23 PROCEDURE — 85610 PROTHROMBIN TIME: CPT | Performed by: EMERGENCY MEDICINE

## 2024-11-23 RX ORDER — MAGNESIUM SULFATE HEPTAHYDRATE 40 MG/ML
2 INJECTION, SOLUTION INTRAVENOUS ONCE
Status: COMPLETED | OUTPATIENT
Start: 2024-11-23 | End: 2024-11-23

## 2024-11-23 RX ORDER — MAGNESIUM GLYCINATE 100 MG
100 CAPSULE ORAL
Qty: 20 CAPSULE | Refills: 0 | Status: SHIPPED | OUTPATIENT
Start: 2024-11-23

## 2024-11-23 RX ORDER — POTASSIUM CHLORIDE 1500 MG/1
40 TABLET, EXTENDED RELEASE ORAL ONCE
Status: COMPLETED | OUTPATIENT
Start: 2024-11-23 | End: 2024-11-23

## 2024-11-23 RX ADMIN — SODIUM CHLORIDE 1000 ML: 0.9 INJECTION, SOLUTION INTRAVENOUS at 16:59

## 2024-11-23 RX ADMIN — MAGNESIUM SULFATE HEPTAHYDRATE 2 G: 40 INJECTION, SOLUTION INTRAVENOUS at 17:42

## 2024-11-23 RX ADMIN — POTASSIUM CHLORIDE 40 MEQ: 1500 TABLET, EXTENDED RELEASE ORAL at 17:42

## 2024-11-23 NOTE — DISCHARGE INSTRUCTIONS
Your labs show your magnesium and potassium are mildly low  Magnesium glycinate was sent into your pharmacy   Please take this magnesium at bedtime only as it helps sleep as well  A referral was made to cardiology for you

## 2024-11-23 NOTE — ED PROVIDER NOTES
Time reflects when diagnosis was documented in both MDM as applicable and the Disposition within this note       Time User Action Codes Description Comment    11/23/2024  5:21 PM Bendock, Mari L Add [R00.2] Palpitations     11/23/2024  5:36 PM Bendock, Mari L Add [E87.6] Hypokalemia     11/23/2024  5:36 PM Bendock, Mari L Add [E83.42] Hypomagnesemia           ED Disposition       ED Disposition   Discharge    Condition   Stable    Date/Time   Sat Nov 23, 2024  6:19 PM    Comment   Deanna Boston discharge to home/self care.                   Assessment & Plan       Medical Decision Making  Differential diagnosis includes but not limited to:  Cardiac arrhythmia is, toxicologic, hypothyroidism, hyperthyroidism, anemia, hyponatremia, hypernatremia, hypomagnesemia, hypermagnesemia, , hypokalemia, hypokalemia, acute kidney injury anxiety, depression, cardiomyopathy, alcohol abuse, tobacco abuse PVCs, prescription medications, pericarditis, myocarditis, septal defect, QTC prolongation, pregnancy, infectious, CO, pheochromocytoma    Will obtain CBC, CMP and magnesium for evaluation for anemia, sepsis, acute kidney injury or electrolyte abnormality.  Will check troponin, proBNP and EKG for cardiac evaluation.  Will add on D-dimer given patient's initial heart rate 116 as well as TSH for complaints of palpitation    Amount and/or Complexity of Data Reviewed  External Data Reviewed: notes.     Details:     HOLTER at [facility] 2022 - no high AVB or arrhythmias. Noted PVC's      Labs: ordered. Decision-making details documented in ED Course.     Details:     No anemia, thrombocytopenia or leukocytosis  No acute kidney injury.  Magnesium at 1.6 and potassium at 3.4  Troponin less than 2  D-dimer within normal range  Coags within normal range  TSH within normal range      Radiology: ordered and independent interpretation performed. Decision-making details documented in ED Course.     Details:     Chest x-ray interpreted by myself as  "no acute findings  ECG/medicine tests: ordered and independent interpretation performed. Decision-making details documented in ED Course.     Details:     No ischemia or arrhythmias          Risk  OTC drugs.  Prescription drug management.        ED Course as of 11/23/24 1821   Sat Nov 23, 2024   1651 Patient 34 year old female coming palpitations that have been increasing over the past two weeks.  On exam patient is resting in bed in no distress.  Her heart rate on my exam is in the 80s.  Discussed with her we will start cardiac workup including D-dimer and TSH        Disclosure: Voice to text software was used in the preparation of this document and could have resulted in translational errors.      Occasional wrong word or \"sound a like\" substitutions may have occurred due to the inherent limitations of voice recognition software.  Read the chart carefully and recognize, using context, where substitutions have occurred.       I have independently reviewed external records are available to me to the level of detail possible within the time constraints of my patient care responsibilities in the ED.       1735 D-Dimer  Negative       1739 Patient's labs are reviewed.  Magnesium and potassium low.  D-dimer and troponin within normal range.  Will obtain chest x-ray       1743 TSH, 3rd generation with Free T4 reflex  WNL       1747 Urine Macroscopic, POC(!)  Trace ketones. IVF running       1821 Patient resting in bed.  Patient had no episodes of palpitations while she was in the emergency department.  Long discussion with patient regarding workup.  Labs, urine, and chest x-ray.    Answered patient questions at bedside.  Referral was placed to cardiology.  I did discuss with her presumed PVCs that she has had frequent of those while on Holter monitor in 2022.  Strict return to ER instructions given      Counseling: I had a detailed discussion with the patient and/or guardian regarding: the historical points, exam " findings, and any diagnostic results supporting the discharge diagnosis, lab results, radiology results, discharge instructions reviewed with patient and/or family/caregiver and understanding was verbalized. Instructions given to return to the emergency department if symptoms worsen or persist, or if there are any questions or concerns that arise at home.     All imaging and/or lab testing discussed with patient, strict return to ED precautions discussed. Patient recommended to follow up promptly with appropriate outpatient provider. Patient and/or family members verbalizes understanding and agrees with plan. Patient and/or family members were given opportunity to ask questions, all questions were answered at this time. Patient is stable for discharge           Medications   sodium chloride 0.9 % bolus 1,000 mL (1,000 mL Intravenous New Bag 11/23/24 1659)   magnesium sulfate 2 g/50 mL IVPB (premix) 2 g (2 g Intravenous New Bag 11/23/24 1742)   potassium chloride (Klor-Con M20) CR tablet 40 mEq (40 mEq Oral Given 11/23/24 1742)       ED Risk Strat Scores   HEART Risk Score      Flowsheet Row Most Recent Value   Heart Score Risk Calculator    History 0 Filed at: 11/23/2024 1738   ECG 1 Filed at: 11/23/2024 1738   Age 0 Filed at: 11/23/2024 1738   Risk Factors 0 Filed at: 11/23/2024 1738   Troponin 0 Filed at: 11/23/2024 1738   HEART Score 1 Filed at: 11/23/2024 1738                           PERC Rule for PE      Flowsheet Row Most Recent Value   PERC Rule for PE    Age >=50 0 Filed at: 11/23/2024 1719   HR >=100 1 Filed at: 11/23/2024 1719   O2 Sat on room air < 95% --   History of PE or DVT 0 Filed at: 11/23/2024 1719   Recent trauma or surgery 0 Filed at: 11/23/2024 1719   Hemoptysis 0 Filed at: 11/23/2024 1719   Exogenous estrogen 0 Filed at: 11/23/2024 1719   Unilateral leg swelling 0 Filed at: 11/23/2024 1719   PERC Rule for PE Results 1 Filed at: 11/23/2024 1719            SBIRT 20yo+      Flowsheet Row Most  Recent Value   Initial Alcohol Screen: US AUDIT-C     1. How often do you have a drink containing alcohol? 0 Filed at: 2024   2. How many drinks containing alcohol do you have on a typical day you are drinking?  0 Filed at: 2024   3a. Male UNDER 65: How often do you have five or more drinks on one occasion? 0 Filed at: 2024   3b. FEMALE Any Age, or MALE 65+: How often do you have 4 or more drinks on one occassion? 0 Filed at: 2024   Audit-C Score 0 Filed at: 2024   TRANG: How many times in the past year have you...    Used an illegal drug or used a prescription medication for non-medical reasons? Never Filed at: 2024            Markel' Criteria for PE      Flowsheet Row Most Recent Value   Wells' Criteria for PE    Clinical signs and symptoms of DVT 0 Filed at: 2024   PE is primary diagnosis or equally likely --   HR >100 1.5 Filed at: 2024   Immobilization at least 3 days or Surgery in the previous 4 weeks 0 Filed at: 2024 171   Previous, objectively diagnosed PE or DVT 0 Filed at: 2024   Hemoptysis 0 Filed at: 2024 171   Malignancy with treatment within 6 months or palliative 0 Filed at: 2024   Markel' Criteria Total 1.5 Filed at: 2024                        History of Present Illness       Chief Complaint   Patient presents with    Palpitations     Pt reports palpitations for the past 1.5 years with increasing severity over the past two weeks. Pt states headache and chest tightness       Past Medical History:   Diagnosis Date    Allergic     Anxiety     Depression     GERD (gastroesophageal reflux disease)     Obesity       Past Surgical History:   Procedure Laterality Date    BARIATRIC SURGERY N/A 2018     SECTION        Family History   Problem Relation Age of Onset    No Known Problems Mother     No Known Problems Father     Depression Sister     No Known Problems  Brother     No Known Problems Son     No Known Problems Daughter     Alzheimer's disease Maternal Grandmother     Cancer Maternal Grandfather     No Known Problems Paternal Grandmother     Stroke Paternal Grandfather     No Known Problems Brother       Social History     Tobacco Use    Smoking status: Never    Smokeless tobacco: Never   Vaping Use    Vaping status: Never Used   Substance Use Topics    Alcohol use: Not Currently    Drug use: Never      E-Cigarette/Vaping    E-Cigarette Use Never User       E-Cigarette/Vaping Substances    Nicotine No     THC No     CBD No     Flavoring No     Other No     Unknown No       I have reviewed and agree with the history as documented.     Patient is a 34-year-old female coming in today with palpitations.  She states that she has had these intermittently over 2 years.  She reports that she was seen several years ago and had a Holter monitor but never got the results.  Over the past 2 weeks, patient symptoms been progressively increasing.  She states that primarily at nighttime when she is laying that that her heart will beat really fast or feels like she skips a beat.  It happens periodically throughout the day but it is not associated with time of day or activity.  She has no recent travel, recent surgeries, history of PE or DVT.  She is not on estrogen.  No strong family history of PE or DVT.  Patient reports that today when she was eating it happened and she became very sweaty and concerned so that is why she came to the ER.  She has no fevers, chills, nausea, vomiting, diarrhea.  She has no shortness of breath, chest pain or syncope. She denies excessive use of OTC medications, coffee/caffeine.  She states that she has 1 cup of coffee in the morning but does not repeated.      History provided by:  Patient and medical records   used: No    Palpitations  Palpitations quality:  Fast  Duration:  2 weeks  Timing:  Intermittent  Progression:  Waxing and  waning  Chronicity:  New  Context: not anxiety, not appetite suppressants, not blood loss, not bronchodilators, not caffeine, not dehydration, not exercise, not hyperventilation, not illicit drugs, not nicotine and not stimulant use    Relieved by:  Nothing  Worsened by:  Nothing  Ineffective treatments:  None tried  Associated symptoms: no back pain, no chest pain, no chest pressure, no cough, no diaphoresis, no dizziness, no hemoptysis, no leg pain, no lower extremity edema, no malaise/fatigue, no nausea, no near-syncope, no numbness, no orthopnea, no PND, no shortness of breath, no syncope, no vomiting and no weakness    Risk factors: no diabetes mellitus, no heart disease, no hx of atrial fibrillation, no hx of DVT, no hx of PE, no hx of thyroid disease, no hypercoagulable state, no hyperthyroidism, no OTC sinus medications and no stress        Review of Systems   Constitutional: Negative.  Negative for chills, diaphoresis, fever and malaise/fatigue.   HENT: Negative.  Negative for ear pain and sore throat.    Eyes: Negative.  Negative for pain and visual disturbance.   Respiratory: Negative.  Negative for cough, hemoptysis and shortness of breath.    Cardiovascular:  Positive for palpitations. Negative for chest pain, orthopnea, syncope, PND and near-syncope.   Gastrointestinal: Negative.  Negative for abdominal pain, nausea and vomiting.   Genitourinary: Negative.  Negative for dysuria and hematuria.   Musculoskeletal: Negative.  Negative for arthralgias and back pain.   Skin:  Negative for color change and rash.   Neurological:  Negative for dizziness, seizures, syncope, weakness and numbness.   Psychiatric/Behavioral: Negative.     All other systems reviewed and are negative.          Objective       ED Triage Vitals   Temperature Pulse Blood Pressure Respirations SpO2 Patient Position - Orthostatic VS   11/23/24 1641 11/23/24 1636 11/23/24 1636 11/23/24 1636 11/23/24 1636 11/23/24 1636   98.3 °F (36.8 °C)  (!) 116 154/70 20 98 % Lying      Temp Source Heart Rate Source BP Location FiO2 (%) Pain Score    11/23/24 1641 11/23/24 1636 11/23/24 1636 -- --    Oral Monitor Right arm        Vitals      Date and Time Temp Pulse SpO2 Resp BP Pain Score FACES Pain Rating User   11/23/24 1752 -- 77 98 % 16 132/61 -- -- ML   11/23/24 1751 -- 71 -- -- 127/62 -- -- ML   11/23/24 1749 -- 67 -- -- 125/53 -- -- ML   11/23/24 1641 98.3 °F (36.8 °C) -- -- -- -- -- -- AW   11/23/24 1636 -- 116 98 % 20 154/70 -- -- AW            Physical Exam  Vitals and nursing note reviewed.   Constitutional:       General: She is awake. She is not in acute distress.     Appearance: Normal appearance. She is well-developed and overweight.   HENT:      Head: Normocephalic and atraumatic.      Right Ear: External ear normal.      Left Ear: External ear normal.      Nose: Nose normal.      Mouth/Throat:      Mouth: Mucous membranes are moist.   Eyes:      General: Lids are normal. Gaze aligned appropriately.      Extraocular Movements: Extraocular movements intact.      Conjunctiva/sclera: Conjunctivae normal.      Pupils: Pupils are equal, round, and reactive to light.   Neck:      Trachea: Trachea and phonation normal.   Cardiovascular:      Rate and Rhythm: Normal rate and regular rhythm.      Pulses:           Radial pulses are 2+ on the right side and 2+ on the left side.        Dorsalis pedis pulses are 2+ on the right side and 2+ on the left side.      Heart sounds: Normal heart sounds, S1 normal and S2 normal. No murmur heard.  Pulmonary:      Effort: Pulmonary effort is normal. No respiratory distress.      Breath sounds: Normal breath sounds.   Abdominal:      Palpations: Abdomen is soft.      Tenderness: There is no abdominal tenderness.   Musculoskeletal:         General: No swelling.      Cervical back: Normal range of motion and neck supple.      Right lower leg: No edema.      Left lower leg: No edema.   Lymphadenopathy:      Cervical: No  cervical adenopathy.   Skin:     General: Skin is warm and dry.      Capillary Refill: Capillary refill takes less than 2 seconds.   Neurological:      General: No focal deficit present.      Mental Status: She is alert and oriented to person, place, and time.      GCS: GCS eye subscore is 4. GCS verbal subscore is 5. GCS motor subscore is 6.      Cranial Nerves: Cranial nerves 2-12 are intact.      Sensory: Sensation is intact.      Motor: Motor function is intact.   Psychiatric:         Mood and Affect: Mood normal.         Behavior: Behavior normal. Behavior is cooperative.         Results Reviewed       Procedure Component Value Units Date/Time    POCT pregnancy, urine [[id number]]  (Normal) Collected: 11/23/24 1749    Lab Status: Final result Updated: 11/23/24 1749     EXT Preg Test, Ur Negative     Control Valid    Urine Macroscopic, POC [[id number]]  (Abnormal) Collected: 11/23/24 1746    Lab Status: Final result Specimen: Urine Updated: 11/23/24 1747     Color, UA Yellow     Clarity, UA Clear     pH, UA 5.5     Leukocytes, UA Negative     Nitrite, UA Negative     Protein, UA Negative mg/dl      Glucose, UA Negative mg/dl      Ketones, UA Trace mg/dl      Urobilinogen, UA 1.0 E.U./dl      Bilirubin, UA Negative     Occult Blood, UA Negative     Specific Gravity, UA >=1.030    Narrative:      CLINITEK RESULT    TSH, 3rd generation with Free T4 reflex [[id number]]  (Normal) Collected: 11/23/24 1655    Lab Status: Final result Specimen: Blood from Arm, Left Updated: 11/23/24 1742     TSH 3RD GENERATON 0.866 uIU/mL     B-Type Natriuretic Peptide(BNP) [[id number]]  (Normal) Collected: 11/23/24 1655    Lab Status: Final result Specimen: Blood from Arm, Left Updated: 11/23/24 1731     BNP 52 pg/mL     HS Troponin 0hr (reflex protocol) [[id number]]  (Normal) Collected: 11/23/24 1655    Lab Status: Final result Specimen: Blood from Arm, Left Updated: 11/23/24 1728     hs TnI 0hr <2 ng/L     Comprehensive metabolic panel  [[id number]]  (Abnormal) Collected: 11/23/24 1655    Lab Status: Final result Specimen: Blood from Arm, Left Updated: 11/23/24 1727     Sodium 135 mmol/L      Potassium 3.4 mmol/L      Chloride 106 mmol/L      CO2 24 mmol/L      ANION GAP 5 mmol/L      BUN 13 mg/dL      Creatinine 0.74 mg/dL      Glucose 147 mg/dL      Calcium 9.0 mg/dL      AST 10 U/L      ALT 9 U/L      Alkaline Phosphatase 55 U/L      Total Protein 6.7 g/dL      Albumin 3.9 g/dL      Total Bilirubin 0.42 mg/dL      eGFR 107 ml/min/1.73sq m     Narrative:      National Kidney Disease Foundation guidelines for Chronic Kidney Disease (CKD):     Stage 1 with normal or high GFR (GFR > 90 mL/min/1.73 square meters)    Stage 2 Mild CKD (GFR = 60-89 mL/min/1.73 square meters)    Stage 3A Moderate CKD (GFR = 45-59 mL/min/1.73 square meters)    Stage 3B Moderate CKD (GFR = 30-44 mL/min/1.73 square meters)    Stage 4 Severe CKD (GFR = 15-29 mL/min/1.73 square meters)    Stage 5 End Stage CKD (GFR <15 mL/min/1.73 square meters)  Note: GFR calculation is accurate only with a steady state creatinine    Magnesium [[id number]]  (Abnormal) Collected: 11/23/24 1655    Lab Status: Final result Specimen: Blood from Arm, Left Updated: 11/23/24 1727     Magnesium 1.6 mg/dL     D-Dimer [[id number]]  (Normal) Collected: 11/23/24 1655    Lab Status: Final result Specimen: Blood from Arm, Left Updated: 11/23/24 1725     D-Dimer, Quant 0.32 ug/ml FEU     Protime-INR [[id number]]  (Normal) Collected: 11/23/24 1655    Lab Status: Final result Specimen: Blood from Arm, Left Updated: 11/23/24 1722     Protime 13.6 seconds      INR 1.02    Narrative:      INR Therapeutic Range    Indication                                             INR Range      Atrial Fibrillation                                               2.0-3.0  Hypercoagulable State                                    2.0.2.3  Left Ventricular Asist Device                            2.0-3.0  Mechanical Heart Valve                                   -    Aortic(with afib, MI, embolism, HF, LA enlargement,    and/or coagulopathy)                                     2.0-3.0 (2.5-3.5)     Mitral                                                             2.5-3.5  Prosthetic/Bioprosthetic Heart Valve               2.0-3.0  Venous thromboembolism (VTE: VT, PE        2.0-3.0    APTT [[id number]]  (Normal) Collected: 11/23/24 1655    Lab Status: Final result Specimen: Blood from Arm, Left Updated: 11/23/24 1722     PTT 26 seconds     CBC and differential [[id number]]  (Abnormal) Collected: 11/23/24 1655    Lab Status: Final result Specimen: Blood from Arm, Left Updated: 11/23/24 1706     WBC 9.24 Thousand/uL      RBC 4.86 Million/uL      Hemoglobin 12.6 g/dL      Hematocrit 39.7 %      MCV 82 fL      MCH 25.9 pg      MCHC 31.7 g/dL      RDW 13.5 %      MPV 11.4 fL      Platelets 267 Thousands/uL      nRBC 0 /100 WBCs      Segmented % 68 %      Immature Grans % 0 %      Lymphocytes % 25 %      Monocytes % 5 %      Eosinophils Relative 2 %      Basophils Relative 0 %      Absolute Neutrophils 6.25 Thousands/µL      Absolute Immature Grans 0.02 Thousand/uL      Absolute Lymphocytes 2.33 Thousands/µL      Absolute Monocytes 0.44 Thousand/µL      Eosinophils Absolute 0.16 Thousand/µL      Basophils Absolute 0.04 Thousands/µL             XR chest 2 views   ED Interpretation by Mari Greenberg DO (11/23 1811)   No acute findings          ECG 12 Lead Documentation Only    Date/Time: 11/23/2024 4:52 PM    Performed by: Mari Greenberg DO  Authorized by: Mari Greenberg DO    Indications / Diagnosis:  Palpitations  ECG reviewed by me, the ED Provider: yes    Patient location:  ED  Previous ECG:     Previous ECG:  Unavailable  Interpretation:     Interpretation: normal    Quality:     Tracing quality:  Limited by artifact  Rate:     ECG rate:  84    ECG rate assessment: normal    Rhythm:     Rhythm: sinus rhythm    Ectopy:     Ectopy: none    QRS:      QRS axis:  Normal    QRS intervals:  Normal  Conduction:     Conduction: normal    ST segments:     ST segments:  Non-specific  T waves:     T waves: non-specific    Comments:      Normal axis  QTC @ 423 ms        ED Medication and Procedure Management   Prior to Admission Medications   Prescriptions Last Dose Informant Patient Reported? Taking?   Lidocaine Viscous HCl (XYLOCAINE) 2 % mucosal solution  Self No No   Sig: SWISH AND SPIT 10 ML 4 (FOUR) TIMES A DAY AS NEEDED FOR MOUTH PAIN OR DISCOMFORT   Patient not taking: Reported on 11/15/2024   Semaglutide-Weight Management (Wegovy) 0.25 MG/0.5ML   No No   Sig: Inject 0.25 mg under the skin weekly   Patient not taking: Reported on 11/15/2024   acetaminophen (TYLENOL) 500 mg tablet  Self Yes No   Sig: Take 500 mg by mouth every 6 (six) hours as needed   azelastine (ASTELIN) 0.1 % nasal spray  Self No No   Si spray into each nostril 2 (two) times a day Use in each nostril as directed   Patient not taking: Reported on 2024   budesonide (PULMICORT) 1 MG/2ML nebulizer solution  Self No No   Sig: TAKE 2 ML (1 MG TOTAL) BY NEBULIZATION DAILY RINSE MOUTH AFTER USE.   Patient not taking: Reported on 2024   nystatin-triamcinolone (MYCOLOG-II) cream   No No   Sig: Apply topically 2 (two) times a day   omeprazole (PriLOSEC) 20 mg delayed release capsule  Self No No   Sig: TAKE 1 CAPSULE BY MOUTH EVERY DAY      Facility-Administered Medications: None     Patient's Medications   Discharge Prescriptions    MAGNESIUM GLYCINATE 100 MG CAPS    Take 100 mg by mouth daily at bedtime       Start Date: 2024End Date: --       Order Dose: 100 mg       Quantity: 20 capsule    Refills: 0       ED SEPSIS DOCUMENTATION   Time reflects when diagnosis was documented in both MDM as applicable and the Disposition within this note       Time User Action Codes Description Comment    2024  5:21 PM Mari Greenberg Add [R00.2] Palpitations     2024  5:36 PM Yari  Mari L Add [E87.6] Hypokalemia     11/23/2024  5:36 PM Mari Greenberg [E83.42] Hypomagnesemia                  Mari Greenberg DO  11/23/24 1821

## 2024-11-25 ENCOUNTER — OFFICE VISIT (OUTPATIENT)
Dept: BARIATRICS | Facility: CLINIC | Age: 34
End: 2024-11-25
Payer: COMMERCIAL

## 2024-11-25 ENCOUNTER — TELEPHONE (OUTPATIENT)
Dept: BARIATRICS | Facility: CLINIC | Age: 34
End: 2024-11-25

## 2024-11-25 VITALS
TEMPERATURE: 98.2 F | HEIGHT: 65 IN | WEIGHT: 265 LBS | SYSTOLIC BLOOD PRESSURE: 126 MMHG | DIASTOLIC BLOOD PRESSURE: 78 MMHG | HEART RATE: 81 BPM | OXYGEN SATURATION: 98 % | BODY MASS INDEX: 44.15 KG/M2

## 2024-11-25 DIAGNOSIS — Z98.84 BARIATRIC SURGERY STATUS: ICD-10-CM

## 2024-11-25 DIAGNOSIS — K91.2 POSTSURGICAL MALABSORPTION: ICD-10-CM

## 2024-11-25 DIAGNOSIS — Z48.815 ENCOUNTER FOR SURGICAL AFTERCARE FOLLOWING SURGERY OF DIGESTIVE SYSTEM: Primary | ICD-10-CM

## 2024-11-25 DIAGNOSIS — E66.01 OBESITY, CLASS III, BMI 40-49.9 (MORBID OBESITY) (HCC): ICD-10-CM

## 2024-11-25 PROCEDURE — 99214 OFFICE O/P EST MOD 30 MIN: CPT | Performed by: PHYSICIAN ASSISTANT

## 2024-11-25 NOTE — PROGRESS NOTES
Assessment/Plan:     Patient ID: Deanna Boston is a 34 y.o. female.     Bariatric Surgery Status    status post laparoscopic sleeve gastrectomy performed in [place], [place] in June 2018 by Dr. Lyndsey Calderon. Here for OD annual/postop weight     At her highest weight she was 294 lbs and after the surgery she went down to a zurdo of 172 lbs within the first year.  Over the last years  she has regained most of her weight back which she attributes to sedentary lifestyle and lack of proper follow up to stay on track with diet and exercise.     She saw Dr Kline back in 2023 - She saw Dr Kline back in 2023 . EGD was unable to be done due to insurance issues at the time. She was referred to  our [facility] program and was on wegovy - was doing very well and lost about 25 lbs in 2 months but had to stop due to insurance issue    Was recently in the ER for palpitations found to have to low mag and potassium. Has hx of PVCs  Would avoid phentermine     Patient Is interested in revision if she qualifies. States she has no GLP-1 coverage in her insurance plan currently     Discussed with patient that we need either UGI/EGD as initial evaluation of her anatomy then surgoen follow up to determine qualification for surgery. States she needs to wait a few days to know if she has coverage for the UGI or EGD so will call our office this week to let us know before we can schedule.   Start vitamins and get labs done in the meantime     Continued/Maintain healthy weight loss with good nutrition intakes.  Adequate hydration with at least 64oz. fluid intake.  Follow diet as discussed.  Follow vitamin and mineral recommendations as reviewed with you.  Exercise as tolerated.    Colonoscopy referral made: n/a    Follow-up as scheduled. We kindly ask that your arrive 15 minutes before your scheduled appointment time with your provider to allow our staff to room you, get your vital signs and update your chart.    Get lab work done. Please call the  office if you need a script.  It is recommended to check with your insurance BEFORE getting labs done to make sure they are covered by your policy.      Call our office if you have any problems with abdominal pain especially associated with fever, chills, nausea, vomiting or any other concerns.    All  Post-bariatric surgery patients should be aware that very small quantities of any alcohol can cause impairment and it is very possible not to feel the effect. The effect can be in the system for several hours.  It is also a stomach irritant.     It is advised to AVOID alcohol, Nonsteroidal antiinflammatory drugs (NSAIDS) and nicotine of all forms . Any of these can cause stomach irritation/pain.    Discussed the effects of alcohol on a bariatric patient and the increased impairment risk.     Keep up the good work!     Postsurgical Malabsorption   -At risk for malabsorption of vitamins/minerals secondary to malabsorption and restriction of intake from bariatric surgery  -Next set of bariatric labs ordered for approximately 1 month  -Patient received education about the importance of adhering to a lifelong supplementation regimen to avoid vitamin/mineral deficiencies      Diagnoses and all orders for this visit:    Encounter for surgical aftercare following surgery of digestive system  -     Zinc; Future  -     Vitamin B12; Future  -     Vitamin B1, whole blood; Future  -     Vitamin A; Future  -     PTH, intact; Future  -     Folate; Future    Bariatric surgery status  -     Zinc; Future  -     Vitamin B12; Future  -     Vitamin B1, whole blood; Future  -     Vitamin A; Future  -     PTH, intact; Future  -     Folate; Future    Postsurgical malabsorption  -     Zinc; Future  -     Vitamin B12; Future  -     Vitamin B1, whole blood; Future  -     Vitamin A; Future  -     PTH, intact; Future  -     Folate; Future    Obesity, Class III, BMI 40-49.9 (morbid obesity) ([facility])  -     Zinc; Future  -     Vitamin B12; Future  -    "  Vitamin B1, whole blood; Future  -     Vitamin A; Future  -     PTH, intact; Future  -     Folate; Future         Subjective:      Patient ID: Deanna Boston is a 34 y.o. female.    status post laparoscopic sleeve gastrectomy performed in [place], [place] in June 2018 by Dr. Lyndsey Calderon. Here for OD annual/postop weightTolerating diet without issues; denies N/V, dysphagia, reflux.     Current BMI is Body mass index is 43.83 kg/m².    Tolerating a regular diet-yes  Eating at least 60 grams of protein per day-yes  Drinking at least 64 ounces of fluid per day-yes  Sufficient exercise-occasional walking   Using NSAIDs regularly-no  Using nicotine-no  Using alcohol-no  Supplements:  not currently     The following portions of the patient's history were reviewed and updated as appropriate: allergies, current medications, past family history, past medical history, past social history, past surgical history and problem list.    Review of Systems   Constitutional: Negative.    Respiratory: Negative.     Cardiovascular: Negative.    Gastrointestinal: Negative.    Neurological: Negative.    Psychiatric/Behavioral: Negative.           Objective:    /78 (BP Location: Left arm, Patient Position: Sitting, Cuff Size: Adult)   Pulse 81   Temp 98.2 °F (36.8 °C) (Tympanic)   Ht 5' 5.2\" (1.656 m)   Wt 120 kg (265 lb)   LMP 10/21/2024 (Approximate)   SpO2 98%   BMI 43.83 kg/m²      Physical Exam  Vitals and nursing note reviewed.   Constitutional:       Appearance: Normal appearance. She is obese.   HENT:      Head: Normocephalic and atraumatic.   Eyes:      Extraocular Movements: Extraocular movements intact.   Cardiovascular:      Rate and Rhythm: Normal rate.   Pulmonary:      Effort: Pulmonary effort is normal.   Musculoskeletal:         General: Normal range of motion.      Cervical back: Normal range of motion.   Skin:     General: Skin is warm and dry.   Neurological:      General: No focal deficit present.      " Mental Status: She is alert and oriented to person, place, and time.   Psychiatric:         Mood and Affect: Mood normal.

## 2024-11-25 NOTE — PROGRESS NOTES
Date of surgery: 06/2019  Procedure: Sleeve   Performing surgeon:  Eloy Quiñonez [place]     Initial Weight - 277.5 lb   Current Weight - 265.0 lb   Clifford Weight - 252.8 lb   Total Body Weight Loss (EWL)-  -37.5  EWL% -  - 52%  TWB % -  -16%

## 2024-11-25 NOTE — TELEPHONE ENCOUNTER
Patient to check on coverage for EGD and other testing with her insurance, will call back after verifying to schedule.

## 2024-11-25 NOTE — PATIENT INSTRUCTIONS
Follow-up as scheduled. We kindly ask that your arrive 15 minutes before your scheduled appointment time with your provider to allow our staff to room you, get your vital signs and update your chart.    Get lab work done. Please call the office if you need a script.  It is recommended to check with your insurance BEFORE getting labs done to make sure they are covered by your policy.      Call our office if you have any problems with abdominal pain especially associated with fever, chills, nausea, vomiting or any other concerns.    All  Post-bariatric surgery patients should be aware that very small quantities of any alcohol can cause impairment and it is very possible not to feel the effect. The effect can be in the system for several hours.  It is also a stomach irritant.     It is advised to AVOID alcohol, Nonsteroidal antiinflammatory drugs (NSAIDS) and nicotine of all forms . Any of these can cause stomach irritation/pain.    Discussed the effects of alcohol on a bariatric patient and the increased impairment risk.     Keep up the good work!

## 2025-01-02 DIAGNOSIS — N89.8 VAGINAL ITCHING: ICD-10-CM

## 2025-01-03 DIAGNOSIS — E83.42 HYPOMAGNESEMIA: Primary | ICD-10-CM

## 2025-01-09 ENCOUNTER — TELEPHONE (OUTPATIENT)
Dept: BARIATRICS | Facility: CLINIC | Age: 35
End: 2025-01-09

## 2025-01-09 NOTE — TELEPHONE ENCOUNTER
Called the pt to let her know that I sent her a bariatric multi vitamin to her email so that she may go over so that she can start taking them the pt was transferred by the pod because she lost her vitamin sheet , pt had no further question.

## 2025-01-13 RX ORDER — NYSTATIN AND TRIAMCINOLONE ACETONIDE 100000; 1 [USP'U]/G; MG/G
CREAM TOPICAL 2 TIMES DAILY
Qty: 30 G | Refills: 0 | Status: SHIPPED | OUTPATIENT
Start: 2025-01-13

## 2025-01-13 NOTE — TELEPHONE ENCOUNTER
IF she is having symptoms she can continue to use the cream. I will send in another refill for her.

## 2025-01-14 DIAGNOSIS — E66.01 OBESITY, CLASS III, BMI 40-49.9 (MORBID OBESITY) (HCC): ICD-10-CM

## 2025-01-14 RX ORDER — SEMAGLUTIDE 0.25 MG/.5ML
INJECTION, SOLUTION SUBCUTANEOUS
Qty: 2 ML | Refills: 0 | Status: CANCELLED | OUTPATIENT
Start: 2025-01-14

## 2025-01-14 RX ORDER — SEMAGLUTIDE 0.5 MG/.5ML
INJECTION, SOLUTION SUBCUTANEOUS
Qty: 2 ML | Refills: 0 | Status: SHIPPED | OUTPATIENT
Start: 2025-01-14

## 2025-01-14 NOTE — TELEPHONE ENCOUNTER
Pt called, is doing well on the medication, Wegov 0.25mg. Is ready to increase to the next dose.  Please send message via MY CHART [facility]    Medication:     Semaglutide-Weight Management (Wegovy)       Dose/Frequency: ?    Quantity: ?    Pharmacy: [facility], [address] [place] [address]    Office:   [x] PCP/Provider - Gasper Doran MD   [] Speciality/Provider -     Does the patient have enough for 3 days?   [] Yes   [x] No - Send as HP to POD

## 2025-01-14 NOTE — TELEPHONE ENCOUNTER
Please inform pt that, typically, for insurance to continue to pay for the wegovy she needs to continue to titrate up dose. Sent in the 0.5mg

## 2025-02-07 ENCOUNTER — TELEPHONE (OUTPATIENT)
Age: 35
End: 2025-02-07

## 2025-02-07 ENCOUNTER — OFFICE VISIT (OUTPATIENT)
Age: 35
End: 2025-02-07
Payer: COMMERCIAL

## 2025-02-07 VITALS
OXYGEN SATURATION: 98 % | WEIGHT: 252 LBS | TEMPERATURE: 97.8 F | HEART RATE: 72 BPM | BODY MASS INDEX: 41.99 KG/M2 | DIASTOLIC BLOOD PRESSURE: 82 MMHG | HEIGHT: 65 IN | SYSTOLIC BLOOD PRESSURE: 124 MMHG | RESPIRATION RATE: 18 BRPM

## 2025-02-07 DIAGNOSIS — J30.1 SEASONAL ALLERGIC RHINITIS DUE TO POLLEN: ICD-10-CM

## 2025-02-07 DIAGNOSIS — E66.01 OBESITY, CLASS III, BMI 40-49.9 (MORBID OBESITY) (HCC): ICD-10-CM

## 2025-02-07 DIAGNOSIS — R73.01 IFG (IMPAIRED FASTING GLUCOSE): Primary | ICD-10-CM

## 2025-02-07 DIAGNOSIS — F32.2 SEVERE MAJOR DEPRESSIVE DISORDER (HCC): Primary | ICD-10-CM

## 2025-02-07 DIAGNOSIS — R05.1 ACUTE COUGH: ICD-10-CM

## 2025-02-07 DIAGNOSIS — Z98.84 BARIATRIC SURGERY STATUS: ICD-10-CM

## 2025-02-07 PROCEDURE — 99214 OFFICE O/P EST MOD 30 MIN: CPT | Performed by: FAMILY MEDICINE

## 2025-02-07 RX ORDER — SEMAGLUTIDE 1 MG/.5ML
INJECTION, SOLUTION SUBCUTANEOUS
Qty: 2 ML | Refills: 0 | Status: SHIPPED | OUTPATIENT
Start: 2025-02-07

## 2025-02-07 RX ORDER — FLUTICASONE PROPIONATE 50 MCG
2 SPRAY, SUSPENSION (ML) NASAL DAILY
Qty: 15.8 ML | Refills: 1 | Status: SHIPPED | OUTPATIENT
Start: 2025-02-07

## 2025-02-07 RX ORDER — ALBUTEROL SULFATE 90 UG/1
2 INHALANT RESPIRATORY (INHALATION) EVERY 4 HOURS PRN
Qty: 18 G | Refills: 0 | Status: SHIPPED | OUTPATIENT
Start: 2025-02-07

## 2025-02-07 NOTE — TELEPHONE ENCOUNTER
Patient stated she is doing well on the 0.5 MG of Wegovy. Has had no issues and is ready to go to next dosage of 1 MG.      Medication: Semaglutide-Weight Management (Wegovy)     Dose/Frequency: ?    Quantity: ?    Pharmacy: [facility]/pharm[facility] #[id number] - [place] - [address]     Office:   [x] PCP/Provider - Gasper Doran MD   [] Speciality/Provider -     Does the patient have enough for 3 days?   [x] Yes   [] No - Send as HP to POD

## 2025-02-10 NOTE — PROGRESS NOTES
"Name: Deanna Boston      : 1990      MRN: [id number]  Encounter Provider: Gasper Doran MD  Encounter Date: 2025   Encounter department: [facility]  :  Assessment & Plan  Severe major depressive disorder (HCC)  Stable, continue meds. Discussed supportive care and return parameters.          Seasonal allergic rhinitis due to pollen  Stable, continue meds. Discussed supportive care and return parameters.   Orders:    fluticasone (FLONASE) 50 mcg/act nasal spray; 2 sprays into each nostril daily    Acute cough  Add rescue inhaler and rescue inhaler PRN. Discussed supportive care and return parameters.   Orders:    albuterol (Ventolin HFA) 90 mcg/act inhaler; Inhale 2 puffs every 4 (four) hours as needed for wheezing    XR chest pa and lateral; Future    Obesity, Class III, BMI 40-49.9 (morbid obesity) (HCC)    Stable, continue meds. Discussed supportive care and return parameters.               History of Present Illness   HPI  Review of Systems    Objective   /82 (BP Location: Left arm, Patient Position: Sitting, Cuff Size: Large)   Pulse 72   Temp 97.8 °F (36.6 °C) (Temporal)   Resp 18   Ht 5' 5.2\" (1.656 m)   Wt 114 kg (252 lb)   SpO2 98%   BMI 41.68 kg/m²      Physical Exam    "

## 2025-02-10 NOTE — ASSESSMENT & PLAN NOTE
Stable, continue meds. Discussed supportive care and return parameters.   Orders:    fluticasone (FLONASE) 50 mcg/act nasal spray; 2 sprays into each nostril daily

## 2025-03-02 DIAGNOSIS — J30.1 SEASONAL ALLERGIC RHINITIS DUE TO POLLEN: ICD-10-CM

## 2025-03-03 RX ORDER — FLUTICASONE PROPIONATE 50 MCG
SPRAY, SUSPENSION (ML) NASAL
Qty: 48 ML | Refills: 1 | Status: SHIPPED | OUTPATIENT
Start: 2025-03-03

## 2025-03-17 ENCOUNTER — TELEPHONE (OUTPATIENT)
Age: 35
End: 2025-03-17

## 2025-03-17 DIAGNOSIS — R73.01 IFG (IMPAIRED FASTING GLUCOSE): ICD-10-CM

## 2025-03-17 DIAGNOSIS — E66.01 OBESITY, CLASS III, BMI 40-49.9 (MORBID OBESITY) (HCC): ICD-10-CM

## 2025-03-17 DIAGNOSIS — Z98.84 BARIATRIC SURGERY STATUS: ICD-10-CM

## 2025-03-17 NOTE — TELEPHONE ENCOUNTER
Pt in office stating she is ready for her next dose of Wegovy but pharmacy dose not have a script. She is currently on 0.5mg and is tollerating that dose well. She would like to go up to 1mg. Could you please send Wegovy [facility]mg to [facility] [address] [place] [address], [facility] [address].

## 2025-03-18 RX ORDER — SEMAGLUTIDE 1 MG/.5ML
INJECTION, SOLUTION SUBCUTANEOUS
Qty: 2 ML | Refills: 0 | Status: SHIPPED | OUTPATIENT
Start: 2025-03-18 | End: 2025-03-20 | Stop reason: SDUPTHER

## 2025-03-20 ENCOUNTER — TELEPHONE (OUTPATIENT)
Age: 35
End: 2025-03-20

## 2025-03-20 RX ORDER — SEMAGLUTIDE 1 MG/.5ML
INJECTION, SOLUTION SUBCUTANEOUS
Qty: 2 ML | Refills: 0 | Status: SHIPPED | OUTPATIENT
Start: 2025-03-20 | End: 2026-03-20

## 2025-03-20 NOTE — TELEPHONE ENCOUNTER
Reason for call:   [] Refill   [] Prior Auth  [x] Other: NOT A DUPLICATE. Patient is requesting this to be sent to a different [facility]    Office:   [x] PCP/Provider -   [] Specialty/Provider -     Medication: Semaglutide-Weight Management (Wegovy) 1 MG/0.5ML      Dose/Frequency:  Inject 1 mg under the skin weekly     Quantity: 2 ml    Pharmacy: [facility]/pharmacy #[id number] - [place], [place] - [address]     Local Pharmacy   Does the patient have enough for 3 days?   [] Yes   [] No - Send as HP to POD    Mail Away Pharmacy   Does the patient have enough for 10 days?   [] Yes   [] No - Send as HP to POD

## 2025-03-20 NOTE — TELEPHONE ENCOUNTER
Reason for call: Patient called stating that a new prior auth is needed for her wegovy. Patient stated that she is due for the injection in a few days and was hoping to have this submitted  as soon as possible so she can continue with the current dose.   [x] Prior Auth  [] Other:     Caller:  [x] Patient  [] Pharmacy  Name:   Address:   Callback Number:     Medication: Semaglutide-Weight Management (Wegovy) 1 MG/0.5ML      Dose/Frequency:  Inject 1 mg under the skin weekly     Quantity: 2 ml    Ordering Provider:   [x] PCP/Provider - Gasper Doran MD / [facility]   [] Speciality/Provider -     Has the patient tried other medications and failed? If failed, which medications did they fail?    [] No   [] Yes -     Is the patient's insurance updated in EPIC?   [] Yes   [] No     Is a copy of the patient's insurance scanned in EPIC?   [] Yes   [] No

## 2025-03-20 NOTE — TELEPHONE ENCOUNTER
PA for Semaglutide-Weight Management (Wegovy) 1 MG/0.5ML SUBMITTED to [employer]    via    []CMM-KEY:   [x]Surescripts-Case ID # [id number]   []Availity-Auth ID # NDC #   []Faxed to plan   []Other website   []Phone call Case ID #     [x]PA sent as URGENT    All office notes, labs and other pertaining documents and studies sent. Clinical questions answered. Awaiting determination from insurance company.     Turnaround time for your insurance to make a decision on your Prior Authorization can take 7-21 business days.

## 2025-03-21 NOTE — TELEPHONE ENCOUNTER
Patient called the RX Refill Line. Message is being forwarded to the office.     Patient is requesting to have the PA updated, stated that she spoke with [facility] and was told that her PA says she's only lost 3 lbs since last August but she's really lost 25 lbs since Jan of this year as there was a time period that she wasn't able to find it in stock  Pt also said that the PA needs to be urgent because she's just about due for a shot and does not want to have to restart at the beginning due to missing doses    Pt said that yesterday when she weighed herself she was 242 lbs    Please contact patient at , would like a call back if any questions or when it's been resubmitted with this new information

## 2025-03-21 NOTE — TELEPHONE ENCOUNTER
Medication denied because patient did not have a good outcome from drug per insurance, please review and advise next step

## 2025-03-21 NOTE — TELEPHONE ENCOUNTER
PA for Semaglutide-Weight Management (Wegovy) 1 MG/0.5ML DENIED    Reason:(Screenshot if applicable)    Message sent to office clinical pool Yes    Denial letter scanned into Media Yes    Appeal started No (Provider will need to decide if appeal is warranted and send clinical documentation to Prior Authorization Team for initiation.)    **Please follow up with your patient regarding denial and next steps**

## 2025-03-24 ENCOUNTER — TELEPHONE (OUTPATIENT)
Dept: BARIATRICS | Facility: CLINIC | Age: 35
End: 2025-03-24

## 2025-03-24 NOTE — TELEPHONE ENCOUNTER
Can you please submit for an appeal per Dr ADAMS request to include the above information about total weight loss totals and to include the fact of her difficulty obtaining the medication for a short time period.

## 2025-03-24 NOTE — TELEPHONE ENCOUNTER
Contacted pt in regards to an appt 11/28/25 at 3:30 pm with Joya. Provider will be out of office and appt needed to be rescheduled. Pt rescheduled 12/5/25 at 3 pm with Joya.

## 2025-03-25 ENCOUNTER — CLINICAL SUPPORT (OUTPATIENT)
Age: 35
End: 2025-03-25

## 2025-03-25 VITALS — HEIGHT: 66 IN | WEIGHT: 245.6 LBS | BODY MASS INDEX: 39.47 KG/M2

## 2025-03-25 DIAGNOSIS — E66.01 OBESITY, CLASS III, BMI 40-49.9 (MORBID OBESITY) (HCC): Primary | ICD-10-CM

## 2025-03-25 PROCEDURE — PBNCHG PB NO CHARGE PLACEHOLDER

## 2025-03-25 NOTE — TELEPHONE ENCOUNTER
Patent needs call back ASAP insurance papers filled out incorrectly patient needs medication by Thursday 03/27/25  Patient number [phone]

## 2025-03-25 NOTE — TELEPHONE ENCOUNTER
Patient came in for a nurse visit for weight check.   I reached out to [employer] @ [phone]. Spoke with Shruthi. Explained the situation (I have additional information for the appeal that I can submit that was not submitted with the appeal request and want to know if I can submit it now), she had to transfer me to a specific dept per the insurance. Once connected to that special dept I spoke with Dutch PAREKH. Again explained the situation to him. He attempted to connect me with the appeals dept, however he was able to just ask the representative in that dept what my question was. Per the rep in the appeals dept - since the appeal was already requested we have to let that request be processed - set to be finalized by 4/8. If it is denied again then a 2nd level appeal would need to be initiated. ([employer] Appeal dept [id number])    I reached out to the patient. I explained this information to her. She stated she understands - all be it frustrated - but once this process if complete and figured out she will just have to start over with the dosing which is what she was trying to avoid. I apologized to her for the frustration and the general situation occurring the way that it did. She acknowledged and thanked me for all the time and effort.   **2nd level appeal can have 8/9/24, 11/25/24 and 3/25/24 OV notes submitted along with an updated letter indicating the info of inability to obtain med and weight loss % of 7.9% since 11/2024.

## 2025-03-25 NOTE — TELEPHONE ENCOUNTER
I spoke with the patient. She is upset regarding the information that was submitted vs what is truly accurate regarding her weight. She stated based on the information submitted she only lost approximately 3-5 lbs whereas it is 20+ lbs. She was prescribed the medication in August 2024 but due to shortages at the pharmacy she was not able to actually start it until January. Her concern is having to start the medication all over from scratch if she misses her next dose.   She explained she did see bariatrics in November which shows a lower weight but I did explain to her that our PA team will only submit records based on the office notes of the prescribing office, which she understands.     I scheduled a nurse appt for her to come in for a weight check. I advised I will contact the insurance myself to inquire how to go about things considering the appeal was already initiated but not all the appropriate info was accurately submitted.

## 2025-03-25 NOTE — TELEPHONE ENCOUNTER
Patient states that she saw the information that was submitted the insurance for the appeal. The information that is needed is missing on page 3 and 5. Patient states that the first weigh in was 255lbs 08/09/24 but when she started the a medication in 01/2025 her weight was 266lbs. Patient states that the insurance needs the appeal to state that she did not start\ the medication until 01/2025 due to shortage at pharmacies so that they can adjust the weight loss expectation.     Patient saw weight management on 11/25/24 and weight was 265lb.   Patient states that her current weight is 242lbs.   Patient would like a call back

## 2025-03-25 NOTE — TELEPHONE ENCOUNTER
PA for Semaglutide-Weight Management (Wegovy) 1 MG/0.5ML APPEALED via     []CMM  []SS  [x]Letter sent to insurance via fax- [phone]  []Other site or means     All necessary records sent. Will await response from insurance company    Turnaround time for a decision to be made on an appeal could take up to 30 business days

## 2025-03-26 NOTE — TELEPHONE ENCOUNTER
PA Appeal for Semaglutide-Weight Management (Wegovy) 1 MG/0.5ML APPROVED     Date(s) approved 3/25/2025 - 10/25/2025    Case #  [id number]     Patient advised by          [x]MyChart Message  []Phone call   []LMOM  []L/M to call office as no active Communication consent on file  []Unable to leave detailed message as VM not approved on Communication consent       Pharmacy advised by    []Fax  [x]Phone call    Message sent to office clinical pool Yes    Approval letter scanned into Media Yes

## 2025-04-10 NOTE — PROGRESS NOTES
Assessment & Plan   Diagnoses and all orders for this visit:    Women's annual routine gynecological examination    Screening examination for STI  -     Ct, Ng, Trich vag by ANNA  -     Hepatitis B surface antigen; Future  -     RPR-Syphilis Screening (Total Syphilis IGG/IGM); Future  -     Hepatitis C antibody; Future  -     HIV 1/2 AG/AB w Reflex [facility] for 2 yr old and above; Future        ASSESSMENT & PLAN: Deanna is a 34 y.o.  with normal gynecologic exam.    1.  Routine well woman exam done today.  2.  Cervical Cancer Screening: Pap was not done today.  Current ASCCP Guidelines reviewed.   3.  Deanna accepted STD testing.  chlamydia and gonorrhea testing performed. Safe sex practices have been discussed.  4.  Gardasil is recommended for patients from 9-45 years of age. Deanna has not had the Gardasil vaccine series. She is interested in vaccination, information given.   5. She declined contraception.  6. I have discussed the importance of monthly self-breast exams, exercise a minimum of 150 minutes each week including weight bearing exercises while maintaining a healthy diet including adequate intake of Calcium and Vitamin D.   7. We reviewed area of vaginal itching. Vulvar hygiene care reviewed. Continue mycolog cream. Benign pelvic exam but if symptoms to not improve can consider vulvar biopsy.   8. We reviewed keeping a menstrual diary and notifying If periods are consistently longer than 35 days or if does not achieve a period within three months.   9. Return to office in 12 months for annual exam.   10. Call your insurance company to verify coverage prior to completing any ordered tests.     Subjective     HPI   Deanna Boston is a 34 y.o.  female who presents for annual well woman exam.     Menses are regular, monthly, sometimes will have periods every 38 days but has not tracked periods.  lasting 4 days. denies intermenstrual bleeding, or spotting.   denies vaginal discharge, labial erythema or  lesions.   denies vaginal itching, irritation and dryness. (+) vaginal itching at times.   Contraception: condoms.  Patient is sexually active with male partner of 3 months. Monogamous and feels safe in this relationship. Denies problems with intercourse.   She has no urinary concerns, does not have incontinence. denies breast concerns.   (+) gynecologic surgeries - C/S  Patient does have a family history of breast, endometrial, colon, or ovarian ca. Cancer-related family history includes Cancer in her maternal grandfather. Paternal aunt history of breast cancer.     OB:  OB History    Para Term  AB Living   2 2 2   2   SAB IAB Ectopic Multiple Live Births       2      # Outcome Date GA Lbr Bright/2nd Weight Sex Type Anes PTL Lv   2 Term     M CS-Unspec   WYATT   1 Term     F Vag-Spont   WYATT       Health Maintenance:    Active lifestyle   She does follow a well balanced diet.    She does not use tobacco and alcohol  She does follow with a PCP.    Screening:  Cervical cancer: last pap smear in 2023. Results were NILM, -HRHPV. Patient has not received Gardasil vaccine.   History of abnormal pap smears: denies  Breast cancer: Will begin at age 40 per ACOG guidelines.   Colon cancer: Will begin at age 45.   STD screening: Gc/CT.    Social History:  Social History     Socioeconomic History    Marital status: /Civil Union     Spouse name: Not on file    Number of children: 2    Years of education: 12    Highest education level: Some college, no degree   Occupational History    Occupation: health      Employer: [employer]    Occupation: employed   Tobacco Use    Smoking status: Never    Smokeless tobacco: Never   Vaping Use    Vaping status: Never Used   Substance and Sexual Activity    Alcohol use: Never    Drug use: Never    Sexual activity: Yes     Partners: Male     Birth control/protection: Condom Male   Other Topics Concern    Not on file   Social History Narrative    Not on file     Social  Drivers of Health     Financial Resource Strain: Low Risk  (2/25/2021)    Overall Financial Resource Strain (CARDIA)     Difficulty of Paying Living Expenses: Not hard at all   Food Insecurity: No Food Insecurity (2/25/2021)    Hunger Vital Sign     Worried About Running Out of Food in the Last Year: Never true     Ran Out of Food in the Last Year: Never true   Transportation Needs: No Transportation Needs (2/25/2021)    PRAPARE - Transportation     Lack of Transportation (Medical): No     Lack of Transportation (Non-Medical): No   Physical Activity: Insufficiently Active (9/23/2020)    Exercise Vital Sign     Days of Exercise per Week: 3 days     Minutes of Exercise per Session: 40 min   Stress: Stress Concern Present (9/23/2020)    [employer] - Occupational Stress Questionnaire     Feeling of Stress : To some extent   Social Connections: Moderately Integrated (9/23/2020)    Social Connection and Isolation Panel [NHANES]     Frequency of Communication with Friends and Family: More than three times a week     Frequency of Social Gatherings with Friends and Family: More than three times a week     Attends Taoism Services: 1 to 4 times per year     Active Member of Clubs or Organizations: No     Attends Club or Organization Meetings: Never     Marital Status: Living with partner   Intimate Partner Violence: Not At Risk (9/23/2020)    Humiliation, Afraid, Rape, and Kick questionnaire     Fear of Current or Ex-Partner: No     Emotionally Abused: No     Physically Abused: No     Sexually Abused: No   Housing Stability: Not on file       Social History     Tobacco Use    Smoking status: Never    Smokeless tobacco: Never   Vaping Use    Vaping status: Never Used   Substance Use Topics    Alcohol use: Never    Drug use: Never       Review of Systems   Constitutional: Negative.  Negative for activity change, appetite change and fever.   Respiratory:  Negative for shortness of breath.     Cardiovascular:  Negative for chest pain.   Gastrointestinal:  Negative for abdominal pain, constipation, diarrhea and vomiting.   Genitourinary:  Negative for dyspareunia, dysuria, frequency, menstrual problem, pelvic pain, vaginal bleeding, vaginal discharge and vaginal pain.   Skin:  Negative for color change.   Psychiatric/Behavioral: Negative.     All other systems reviewed and are negative.      The following portions of the patient's history were reviewed and updated as appropriate: allergies, current medications, past family history, past medical history, past social history, past surgical history, and problem list.         OB History          2    Para   2    Term   2            AB        Living   2         SAB        IAB        Ectopic        Multiple        Live Births   2                 Past Medical History:   Diagnosis Date    Allergic     [place], ibuprophen    Anxiety     Depression     GERD (gastroesophageal reflux disease)     Headache(784.0)     Memory loss     Obesity        Past Surgical History:   Procedure Laterality Date    BARIATRIC SURGERY N/A 2018     SECTION         Family History   Problem Relation Age of Onset    No Known Problems Mother     No Known Problems Father     Depression Sister     No Known Problems Brother     No Known Problems Son     No Known Problems Daughter     Alzheimer's disease Maternal Grandmother     Cancer Maternal Grandfather     No Known Problems Paternal Grandmother     Stroke Paternal Grandfather     No Known Problems Brother          Current Outpatient Medications:     acetaminophen (TYLENOL) 500 mg tablet, Take 500 mg by mouth every 6 (six) hours as needed, Disp: , Rfl:     albuterol (Ventolin HFA) 90 mcg/act inhaler, Inhale 2 puffs every 4 (four) hours as needed for wheezing, Disp: 18 g, Rfl: 0    Semaglutide-Weight Management (Wegovy) 1 MG/0.5ML, Inject 1 mg under the skin weekly, Disp: 2 mL, Rfl: 0    azelastine (ASTELIN)  0.1 % nasal spray, 1 spray into each nostril 2 (two) times a day Use in each nostril as directed (Patient not taking: Reported on 8/9/2024), Disp: 1 mL, Rfl: 0    budesonide (PULMICORT) 1 MG/2ML nebulizer solution, TAKE 2 ML (1 MG TOTAL) BY NEBULIZATION DAILY RINSE MOUTH AFTER USE. (Patient not taking: Reported on 8/9/2024), Disp: 180 mL, Rfl: 1    fluticasone (FLONASE) 50 mcg/act nasal spray, SPRAY 2 SPRAYS INTO EACH NOSTRIL EVERY DAY (Patient not taking: Reported on 4/11/2025), Disp: 48 mL, Rfl: 1    Lidocaine Viscous HCl (XYLOCAINE) 2 % mucosal solution, SWISH AND SPIT 10 ML 4 (FOUR) TIMES A DAY AS NEEDED FOR MOUTH PAIN OR DISCOMFORT (Patient not taking: Reported on 11/15/2024), Disp: 200 mL, Rfl: 0    Magnesium Glycinate 100 MG CAPS, Take 100 mg by mouth daily at bedtime (Patient not taking: Reported on 4/11/2025), Disp: 20 capsule, Rfl: 0    nystatin-triamcinolone (MYCOLOG-II) cream, Apply topically 2 (two) times a day (Patient not taking: Reported on 4/11/2025), Disp: 30 g, Rfl: 0    omeprazole (PriLOSEC) 20 mg delayed release capsule, TAKE 1 CAPSULE BY MOUTH EVERY DAY (Patient not taking: Reported on 11/25/2024), Disp: 90 capsule, Rfl: 1    Allergies   Allergen Reactions    Ibuprofen Other (See Comments)     Bariatric surgery    Penicillins Fever       Objective   Vitals:    04/11/25 0729   BP: 136/84   BP Location: Right arm   Patient Position: Sitting   Cuff Size: Large   Weight: 112 kg (247 lb)     Physical Exam  Vitals and nursing note reviewed.   Constitutional:       General: She is not in acute distress.     Appearance: Normal appearance. She is not ill-appearing.   HENT:      Head: Normocephalic.   Neck:      Thyroid: No thyromegaly or thyroid tenderness.   Cardiovascular:      Rate and Rhythm: Normal rate and regular rhythm.      Heart sounds: Normal heart sounds.   Pulmonary:      Effort: Pulmonary effort is normal. No respiratory distress.      Breath sounds: Normal breath sounds.   Chest:    Breasts:     Right: Normal. No inverted nipple, nipple discharge, skin change or tenderness.      Left: Normal. No inverted nipple, nipple discharge, skin change or tenderness.   Abdominal:      General: Abdomen is flat.      Palpations: Abdomen is soft.      Tenderness: There is no abdominal tenderness. There is no guarding.   Genitourinary:     General: Normal vulva.      Exam position: Lithotomy position.      Labia:         Right: No rash, tenderness or lesion.         Left: No rash, tenderness or lesion.       Urethra: No prolapse.      Vagina: Normal. No vaginal discharge, erythema, tenderness or lesions.      Cervix: Normal. No cervical motion tenderness, discharge or lesion.      Uterus: Not tender and no uterine prolapse.       Adnexa:         Right: No tenderness.          Left: No tenderness.            Comments: Area of vaginal itching intermittent, no skin changes or lesions noted at area.   Musculoskeletal:      Cervical back: Neck supple.   Lymphadenopathy:      Cervical: No cervical adenopathy.   Skin:     General: Skin is warm and dry.      Capillary Refill: Capillary refill takes less than 2 seconds.   Neurological:      General: No focal deficit present.      Mental Status: She is alert and oriented to person, place, and time.   Psychiatric:         Mood and Affect: Mood normal.         Behavior: Behavior normal.         Thought Content: Thought content normal.       Bindu NASSAR  OB/GYN  4/11/2025  9:55 AM

## 2025-04-11 ENCOUNTER — ANNUAL EXAM (OUTPATIENT)
Dept: OBGYN CLINIC | Facility: CLINIC | Age: 35
End: 2025-04-11
Payer: COMMERCIAL

## 2025-04-11 VITALS — WEIGHT: 247 LBS | BODY MASS INDEX: 40.48 KG/M2 | DIASTOLIC BLOOD PRESSURE: 84 MMHG | SYSTOLIC BLOOD PRESSURE: 136 MMHG

## 2025-04-11 DIAGNOSIS — Z01.419 WOMEN'S ANNUAL ROUTINE GYNECOLOGICAL EXAMINATION: Primary | ICD-10-CM

## 2025-04-11 DIAGNOSIS — Z11.3 SCREENING EXAMINATION FOR STI: ICD-10-CM

## 2025-04-11 PROCEDURE — 99395 PREV VISIT EST AGE 18-39: CPT

## 2025-04-12 LAB
C TRACH RRNA SPEC QL NAA+PROBE: NOT DETECTED
N GONORRHOEA RRNA SPEC QL NAA+PROBE: NOT DETECTED
T VAGINALIS RRNA SPEC QL NAA+PROBE: NOT DETECTED

## 2025-04-14 ENCOUNTER — TELEPHONE (OUTPATIENT)
Age: 35
End: 2025-04-14

## 2025-04-14 ENCOUNTER — RESULTS FOLLOW-UP (OUTPATIENT)
Dept: OBGYN CLINIC | Facility: CLINIC | Age: 35
End: 2025-04-14

## 2025-04-14 DIAGNOSIS — E66.813 OBESITY, CLASS III, BMI 40-49.9 (MORBID OBESITY): ICD-10-CM

## 2025-04-14 DIAGNOSIS — R73.01 IFG (IMPAIRED FASTING GLUCOSE): Primary | ICD-10-CM

## 2025-04-14 NOTE — TELEPHONE ENCOUNTER
Last visit 02/07/2025  Next appt 08/06/2025    Patient is requesting a refill for Wegovy at the higher dosage of 1.7 mg at the [facility] pharmacy in [facility]. Please advise.

## 2025-04-15 RX ORDER — SEMAGLUTIDE 1.7 MG/.75ML
INJECTION, SOLUTION SUBCUTANEOUS
Qty: 3 ML | Refills: 0 | Status: SHIPPED | OUTPATIENT
Start: 2025-04-15

## 2025-05-09 ENCOUNTER — TELEPHONE (OUTPATIENT)
Age: 35
End: 2025-05-09

## 2025-05-09 DIAGNOSIS — E66.813 OBESITY, CLASS III, BMI 40-49.9 (MORBID OBESITY): ICD-10-CM

## 2025-05-09 DIAGNOSIS — R73.01 IFG (IMPAIRED FASTING GLUCOSE): ICD-10-CM

## 2025-05-09 DIAGNOSIS — Z98.84 STATUS POST LAPAROSCOPIC SLEEVE GASTRECTOMY: Primary | ICD-10-CM

## 2025-05-09 NOTE — TELEPHONE ENCOUNTER
Patient called the RX Refill Line. Message is being forwarded to the office.     Patient is requesting a refill on Wegovy. She would like to increase her dose and is asking for a 3 month supply. Please send script to [facility] in [place] [facility].     Please contact patient at [phone]

## 2025-05-13 RX ORDER — SEMAGLUTIDE 2.4 MG/.75ML
INJECTION, SOLUTION SUBCUTANEOUS
Qty: 9 ML | Refills: 1 | Status: SHIPPED | OUTPATIENT
Start: 2025-05-13

## 2025-05-13 NOTE — TELEPHONE ENCOUNTER
Patient called requesting refill for wegovy. Patient made aware medication was refilled on 5/13 for 9mL with 1 refills to [facility] pharmacy. Patient instructed to contact the pharmacy and speak with someone directly to obtain refills of medication. Patient advised to call back for refill if their pharmacy is unable to assist them. Patient verbalized understanding.

## 2025-05-23 ENCOUNTER — TELEPHONE (OUTPATIENT)
Age: 35
End: 2025-05-23

## 2025-05-23 DIAGNOSIS — Z98.84 BARIATRIC SURGERY STATUS: ICD-10-CM

## 2025-05-23 DIAGNOSIS — Z98.84 STATUS POST LAPAROSCOPIC SLEEVE GASTRECTOMY: ICD-10-CM

## 2025-05-23 DIAGNOSIS — R73.01 IFG (IMPAIRED FASTING GLUCOSE): ICD-10-CM

## 2025-05-23 DIAGNOSIS — G47.33 OSA (OBSTRUCTIVE SLEEP APNEA): Primary | ICD-10-CM

## 2025-05-23 DIAGNOSIS — E66.813 OBESITY, CLASS III, BMI 40-49.9 (MORBID OBESITY): ICD-10-CM

## 2025-05-23 RX ORDER — SEMAGLUTIDE 1.7 MG/.75ML
INJECTION, SOLUTION SUBCUTANEOUS
Qty: 3 ML | Refills: 2 | Status: SHIPPED | OUTPATIENT
Start: 2025-05-23

## 2025-05-23 NOTE — TELEPHONE ENCOUNTER
Reason for call:   [x] Refill   [] Prior Auth  [x] Other: Pt not doing well with 2.4 mg and wants to decrease back to 1.7     Office:   [x] PCP/Provider -   [] Specialty/Provider -     Medication: Wegovy 1.7 mg once weekly       Pharmacy: : [id number] [place] - [address] [facility]   Does the patient have enough for 3 days?   [] Yes   [x] No - Send as HP to POD      How are you tolerating the medication?   [x] Nausea  [x] Vomiting  [] Diarrhea  [] Asymptomatic  [x] Other:Headache    Last visit weight:    Current weight:    Date of last injection: 5/17/25    How many injections do you have left:

## 2025-05-28 ENCOUNTER — TELEPHONE (OUTPATIENT)
Age: 35
End: 2025-05-28

## 2025-05-28 NOTE — TELEPHONE ENCOUNTER
Patient reporting issue having decreased dose of wegovy filled. Patient did not tolerate 2.4 dose and was decreased back to 1.7mg which is not going through at pharmacy since patient recently had 2.4mg filled. May need prior authorization for dose change? Patient due for injection Friday, please process HP

## 2025-05-28 NOTE — TELEPHONE ENCOUNTER
I spoke with the pharmacist, explained to him the 2.4 was not tolerated so she will be going back to the 1.7mg. Explained this will be an immediate change. He was able to run the 1.7mg through the insurance; everything went through fine. He is able to fill the 1.7mg as he does have a box in stock. Will notify the patient when ready.   (He indicated he was not sure if insurance will allow the fill because she just got the 2.4mg 10 days ago.)    I contacted the patient to inform her of all the information.

## 2025-06-19 ENCOUNTER — TELEPHONE (OUTPATIENT)
Age: 35
End: 2025-06-19

## 2025-06-19 DIAGNOSIS — R39.9 UTI SYMPTOMS: Primary | ICD-10-CM

## 2025-06-19 RX ORDER — NITROFURANTOIN 25; 75 MG/1; MG/1
100 CAPSULE ORAL 2 TIMES DAILY
Qty: 10 CAPSULE | Refills: 0 | Status: SHIPPED | OUTPATIENT
Start: 2025-06-19 | End: 2025-06-24

## 2025-06-19 NOTE — TELEPHONE ENCOUNTER
Patient calling to ask if Primary Care Provider could prescribe medication for possible UTI. Patient states she has had symptoms 2-3 days and has had UTIs in the past. Patient's last office visit with Primary Care Provider was 2/7/25. Next office visit is 8/6/25. Patient declined to schedule appointment due to transportation concerns. Pharmacy is [address]. Please contact patient at [phone] once reviewed. Thank you!

## 2025-08-18 ENCOUNTER — TELEPHONE (OUTPATIENT)
Dept: BARIATRICS | Facility: CLINIC | Age: 35
End: 2025-08-18